# Patient Record
Sex: MALE | Race: WHITE | NOT HISPANIC OR LATINO | Employment: FULL TIME | URBAN - METROPOLITAN AREA
[De-identification: names, ages, dates, MRNs, and addresses within clinical notes are randomized per-mention and may not be internally consistent; named-entity substitution may affect disease eponyms.]

---

## 2023-06-26 ENCOUNTER — HOSPITAL ENCOUNTER (INPATIENT)
Facility: HOSPITAL | Age: 38
LOS: 7 days | Discharge: HOME | DRG: 885 | End: 2023-07-03
Attending: PSYCHIATRY & NEUROLOGY | Admitting: PSYCHIATRY & NEUROLOGY
Payer: MEDICAID

## 2023-06-26 DIAGNOSIS — R45.851 DEPRESSION WITH SUICIDAL IDEATION: Primary | ICD-10-CM

## 2023-06-26 DIAGNOSIS — F32.A DEPRESSION WITH SUICIDAL IDEATION: Primary | ICD-10-CM

## 2023-06-26 PROCEDURE — 80061 LIPID PANEL: CPT | Performed by: PSYCHIATRY & NEUROLOGY

## 2023-06-26 PROCEDURE — 11400000 HC PSYCH PRIVATE ROOM

## 2023-06-26 PROCEDURE — 83036 HEMOGLOBIN GLYCOSYLATED A1C: CPT | Performed by: PSYCHIATRY & NEUROLOGY

## 2023-06-26 RX ORDER — ONDANSETRON 4 MG/1
4 TABLET, ORALLY DISINTEGRATING ORAL EVERY 8 HOURS PRN
Status: DISCONTINUED | OUTPATIENT
Start: 2023-06-26 | End: 2023-07-03 | Stop reason: HOSPADM

## 2023-06-26 RX ORDER — BENZTROPINE MESYLATE 1 MG/ML
2 INJECTION, SOLUTION INTRAMUSCULAR; INTRAVENOUS EVERY 8 HOURS PRN
Status: DISCONTINUED | OUTPATIENT
Start: 2023-06-26 | End: 2023-07-03 | Stop reason: HOSPADM

## 2023-06-26 RX ORDER — PROMETHAZINE HYDROCHLORIDE 25 MG/1
25 TABLET ORAL EVERY 6 HOURS PRN
Status: DISCONTINUED | OUTPATIENT
Start: 2023-06-26 | End: 2023-07-03 | Stop reason: HOSPADM

## 2023-06-26 RX ORDER — OLANZAPINE 10 MG/2ML
10 INJECTION, POWDER, FOR SOLUTION INTRAMUSCULAR EVERY 8 HOURS PRN
Status: DISCONTINUED | OUTPATIENT
Start: 2023-06-26 | End: 2023-07-03 | Stop reason: HOSPADM

## 2023-06-26 RX ORDER — ACETAMINOPHEN 325 MG/1
650 TABLET ORAL EVERY 6 HOURS PRN
Status: DISCONTINUED | OUTPATIENT
Start: 2023-06-26 | End: 2023-07-03 | Stop reason: HOSPADM

## 2023-06-26 RX ORDER — LOPERAMIDE HYDROCHLORIDE 2 MG/1
2 CAPSULE ORAL
Status: DISCONTINUED | OUTPATIENT
Start: 2023-06-26 | End: 2023-07-03 | Stop reason: HOSPADM

## 2023-06-26 RX ORDER — BENZONATATE 100 MG/1
100 CAPSULE ORAL 3 TIMES DAILY PRN
Status: DISCONTINUED | OUTPATIENT
Start: 2023-06-26 | End: 2023-07-03 | Stop reason: HOSPADM

## 2023-06-26 RX ORDER — MAG HYDROX/ALUMINUM HYD/SIMETH 200-200-20
30 SUSPENSION, ORAL (FINAL DOSE FORM) ORAL EVERY 6 HOURS PRN
Status: DISCONTINUED | OUTPATIENT
Start: 2023-06-26 | End: 2023-07-03 | Stop reason: HOSPADM

## 2023-06-26 RX ORDER — IBUPROFEN 200 MG
1 TABLET ORAL DAILY PRN
Status: DISCONTINUED | OUTPATIENT
Start: 2023-06-26 | End: 2023-07-03 | Stop reason: HOSPADM

## 2023-06-26 RX ORDER — OLANZAPINE 10 MG/1
10 TABLET ORAL EVERY 8 HOURS PRN
Status: DISCONTINUED | OUTPATIENT
Start: 2023-06-26 | End: 2023-07-03 | Stop reason: HOSPADM

## 2023-06-26 RX ORDER — HYDROXYZINE PAMOATE 50 MG/1
50 CAPSULE ORAL EVERY 6 HOURS PRN
Status: DISCONTINUED | OUTPATIENT
Start: 2023-06-26 | End: 2023-07-03 | Stop reason: HOSPADM

## 2023-06-27 PROBLEM — N17.9 AKI (ACUTE KIDNEY INJURY): Status: ACTIVE | Noted: 2023-06-27

## 2023-06-27 PROBLEM — F15.90 AMPHETAMINE USE: Status: ACTIVE | Noted: 2023-06-27

## 2023-06-27 PROBLEM — F32.A DEPRESSION WITH SUICIDAL IDEATION: Status: ACTIVE | Noted: 2023-06-27

## 2023-06-27 PROBLEM — F17.200 TOBACCO USE DISORDER: Status: ACTIVE | Noted: 2023-06-27

## 2023-06-27 PROBLEM — I10 ESSENTIAL HYPERTENSION: Status: ACTIVE | Noted: 2023-06-27

## 2023-06-27 PROBLEM — R45.851 DEPRESSION WITH SUICIDAL IDEATION: Status: ACTIVE | Noted: 2023-06-27

## 2023-06-27 PROBLEM — D72.829 LEUKOCYTOSIS: Status: ACTIVE | Noted: 2023-06-27

## 2023-06-27 LAB
ALBUMIN SERPL BCP-MCNC: 3.8 G/DL (ref 3.5–5.2)
ALP SERPL-CCNC: 67 U/L (ref 55–135)
ALT SERPL W/O P-5'-P-CCNC: 17 U/L (ref 10–44)
ANION GAP SERPL CALC-SCNC: 12 MMOL/L (ref 8–16)
AST SERPL-CCNC: 20 U/L (ref 10–40)
BASOPHILS # BLD AUTO: 0.05 K/UL (ref 0–0.2)
BASOPHILS NFR BLD: 0.4 % (ref 0–1.9)
BILIRUB SERPL-MCNC: 0.4 MG/DL (ref 0.1–1)
BUN SERPL-MCNC: 16 MG/DL (ref 6–20)
CALCIUM SERPL-MCNC: 9.4 MG/DL (ref 8.7–10.5)
CHLORIDE SERPL-SCNC: 101 MMOL/L (ref 95–110)
CHOLEST SERPL-MCNC: 181 MG/DL (ref 120–199)
CHOLEST/HDLC SERPL: 3.6 {RATIO} (ref 2–5)
CO2 SERPL-SCNC: 25 MMOL/L (ref 23–29)
CREAT SERPL-MCNC: 1.5 MG/DL (ref 0.5–1.4)
DIFFERENTIAL METHOD: ABNORMAL
EOSINOPHIL # BLD AUTO: 0.4 K/UL (ref 0–0.5)
EOSINOPHIL NFR BLD: 3.4 % (ref 0–8)
ERYTHROCYTE [DISTWIDTH] IN BLOOD BY AUTOMATED COUNT: 12.6 % (ref 11.5–14.5)
EST. GFR  (NO RACE VARIABLE): >60 ML/MIN/1.73 M^2
ESTIMATED AVG GLUCOSE: 108 MG/DL (ref 68–131)
GLUCOSE SERPL-MCNC: 103 MG/DL (ref 70–110)
HBA1C MFR BLD: 5.4 % (ref 4–5.6)
HCT VFR BLD AUTO: 40.2 % (ref 40–54)
HDLC SERPL-MCNC: 50 MG/DL (ref 40–75)
HDLC SERPL: 27.6 % (ref 20–50)
HGB BLD-MCNC: 13.2 G/DL (ref 14–18)
IMM GRANULOCYTES # BLD AUTO: 0.03 K/UL (ref 0–0.04)
IMM GRANULOCYTES NFR BLD AUTO: 0.3 % (ref 0–0.5)
LDLC SERPL CALC-MCNC: 100.4 MG/DL (ref 63–159)
LYMPHOCYTES # BLD AUTO: 1.9 K/UL (ref 1–4.8)
LYMPHOCYTES NFR BLD: 16.4 % (ref 18–48)
MCH RBC QN AUTO: 30 PG (ref 27–31)
MCHC RBC AUTO-ENTMCNC: 32.8 G/DL (ref 32–36)
MCV RBC AUTO: 91 FL (ref 82–98)
MONOCYTES # BLD AUTO: 0.6 K/UL (ref 0.3–1)
MONOCYTES NFR BLD: 5.4 % (ref 4–15)
NEUTROPHILS # BLD AUTO: 8.8 K/UL (ref 1.8–7.7)
NEUTROPHILS NFR BLD: 74.1 % (ref 38–73)
NONHDLC SERPL-MCNC: 131 MG/DL
NRBC BLD-RTO: 0 /100 WBC
PLATELET # BLD AUTO: 273 K/UL (ref 150–450)
PMV BLD AUTO: 8.6 FL (ref 9.2–12.9)
POTASSIUM SERPL-SCNC: 4.3 MMOL/L (ref 3.5–5.1)
PROT SERPL-MCNC: 7.6 G/DL (ref 6–8.4)
RBC # BLD AUTO: 4.4 M/UL (ref 4.6–6.2)
SODIUM SERPL-SCNC: 138 MMOL/L (ref 136–145)
TRIGL SERPL-MCNC: 153 MG/DL (ref 30–150)
WBC # BLD AUTO: 11.84 K/UL (ref 3.9–12.7)

## 2023-06-27 PROCEDURE — 99232 SBSQ HOSP IP/OBS MODERATE 35: CPT | Mod: 95,,, | Performed by: PHYSICIAN ASSISTANT

## 2023-06-27 PROCEDURE — 36415 COLL VENOUS BLD VENIPUNCTURE: CPT | Performed by: PSYCHIATRY & NEUROLOGY

## 2023-06-27 PROCEDURE — 85025 COMPLETE CBC W/AUTO DIFF WBC: CPT | Performed by: PHYSICIAN ASSISTANT

## 2023-06-27 PROCEDURE — 25000003 PHARM REV CODE 250: Performed by: STUDENT IN AN ORGANIZED HEALTH CARE EDUCATION/TRAINING PROGRAM

## 2023-06-27 PROCEDURE — 99223 PR INITIAL HOSPITAL CARE,LEVL III: ICD-10-PCS | Mod: ,,, | Performed by: STUDENT IN AN ORGANIZED HEALTH CARE EDUCATION/TRAINING PROGRAM

## 2023-06-27 PROCEDURE — 99223 1ST HOSP IP/OBS HIGH 75: CPT | Mod: ,,, | Performed by: STUDENT IN AN ORGANIZED HEALTH CARE EDUCATION/TRAINING PROGRAM

## 2023-06-27 PROCEDURE — 99232 PR SUBSEQUENT HOSPITAL CARE,LEVL II: ICD-10-PCS | Mod: 95,,, | Performed by: PHYSICIAN ASSISTANT

## 2023-06-27 PROCEDURE — 90833 PR PSYCHOTHERAPY W/PATIENT W/E&M, 30 MIN (ADD ON): ICD-10-PCS | Mod: ,,, | Performed by: STUDENT IN AN ORGANIZED HEALTH CARE EDUCATION/TRAINING PROGRAM

## 2023-06-27 PROCEDURE — 80053 COMPREHEN METABOLIC PANEL: CPT | Performed by: PHYSICIAN ASSISTANT

## 2023-06-27 PROCEDURE — 11400000 HC PSYCH PRIVATE ROOM

## 2023-06-27 PROCEDURE — 36415 COLL VENOUS BLD VENIPUNCTURE: CPT | Performed by: PHYSICIAN ASSISTANT

## 2023-06-27 PROCEDURE — 90833 PSYTX W PT W E/M 30 MIN: CPT | Mod: ,,, | Performed by: STUDENT IN AN ORGANIZED HEALTH CARE EDUCATION/TRAINING PROGRAM

## 2023-06-27 PROCEDURE — 25000003 PHARM REV CODE 250: Performed by: PHYSICIAN ASSISTANT

## 2023-06-27 RX ORDER — CLONAZEPAM 1 MG/1
1 TABLET ORAL 3 TIMES DAILY
Status: DISCONTINUED | OUTPATIENT
Start: 2023-06-27 | End: 2023-07-03 | Stop reason: HOSPADM

## 2023-06-27 RX ORDER — AMLODIPINE BESYLATE 5 MG/1
5 TABLET ORAL DAILY
Status: DISCONTINUED | OUTPATIENT
Start: 2023-06-27 | End: 2023-07-01

## 2023-06-27 RX ORDER — ARIPIPRAZOLE 10 MG/1
10 TABLET ORAL DAILY
Status: DISCONTINUED | OUTPATIENT
Start: 2023-06-28 | End: 2023-06-30

## 2023-06-27 RX ADMIN — CLONAZEPAM 1 MG: 1 TABLET ORAL at 03:06

## 2023-06-27 RX ADMIN — CLONAZEPAM 1 MG: 1 TABLET ORAL at 08:06

## 2023-06-27 RX ADMIN — AMLODIPINE BESYLATE 5 MG: 5 TABLET ORAL at 03:06

## 2023-06-27 NOTE — NURSING
Pt calm with a flat affect with a delayed response. Pt recently discharged form [place]. Pt admits to having suicidal thoughts with no plan. Denies HI. Pt says he is hearing voices but is unable to determine what they are saying. Patient contracted safety with staff and unit. Educated, reviewed  and discussed plan of care and medication regiment with this patient. Patient voiced understanding of all teachings.

## 2023-06-27 NOTE — PLAN OF CARE
Patient to [facility] from [facility], via wheelchair, wearing hospital provided paper scrubs and socks. escorted by hospital security and 1 MH. WANDED, no contraband found, ambulated to assessment room without difficulty. Skin assessment completed.   Patient admitted to U per PEC:

## 2023-06-27 NOTE — PLAN OF CARE
Pt is somewhat subdued, still slightly sedated.  Resting in his room a lot.  Pt encouraged to drink po fluids and increase activity as tolerated, understanding verbalized.  Pt denies any S/I or H/I at this time.  Med compliant.  Will allow pt rest and continue to monitor.
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
## 2023-06-27 NOTE — ASSESSMENT & PLAN NOTE
Mildly elevated  Likely due to meth use  Afebrile   Repeat cbc pending  U/A positive nitrites but denies leukocytes and elevated WBC (dont suspect UTI)

## 2023-06-27 NOTE — HPI
Patient is a 38 year old male with medical history of HTN, tobacco use disorder, bipolar 1 disorder and methamphetamine who was admitted to [facility] for psychosis.  Hospital medicine consulted for medical management.

## 2023-06-27 NOTE — PLAN OF CARE
Lying quietly in bed, eyes closed, respirations even, unlabored. Apparently asleep. Slept 5 hours thus far without interruption. Safety precautions maintained. Rounds done every 15 minutes. Bed is fixed in low position and room is uncluttered and pathways are clear.

## 2023-06-27 NOTE — CONSULTS
[facility] Medicine  Consult Note    Patient Name: Richie Zaman  MRN: [id number]  Admission Date: 6/26/2023  Hospital Length of Stay: 1 days  Attending Physician: Mohit Tyson III, MD   Primary Care Provider: Babar Low MD           Patient information was obtained from patient and ER records.     Inpatient consult to [facility] for History and Physical  Consult performed by: Adela Lui PA-C  Consult ordered by: David Bowen MD        Subjective:     Principal Problem: Depression with suicidal ideation    Chief Complaint: No chief complaint on file.       HPI: Patient is a 38 year old male with medical history of HTN, tobacco use disorder, bipolar 1 disorder and methamphetamine who was admitted to [facility] for psychosis.  Hospital medicine consulted for medical management.        Past Medical History:   Diagnosis Date    Bipolar 1 disorder     Methamphetamine abuse        No past surgical history on file.    Review of patient's allergies indicates:  No Known Allergies    Current Facility-Administered Medications on File Prior to Encounter   Medication    [COMPLETED] cefTRIAXone (ROCEPHIN) 1 g in dextrose 5 % in water (D5W) 5 % 100 mL IVPB (MB+)    [COMPLETED] LORazepam tablet 2 mg    [COMPLETED] sodium chloride 0.9% bolus 1,000 mL 1,000 mL    [DISCONTINUED] diphenhydrAMINE injection 50 mg    [DISCONTINUED] haloperidol lactate injection 5 mg    [DISCONTINUED] LORazepam injection 2 mg     Current Outpatient Medications on File Prior to Encounter   Medication Sig    busPIRone (BUSPAR) 15 MG tablet Take 15 mg by mouth 3 (three) times daily.    cefdinir (OMNICEF) 300 MG capsule Take 1 capsule (300 mg total) by mouth 2 (two) times daily. for 10 days    clonazePAM (KLONOPIN) 0.5 MG tablet Take 1 tablet (0.5 mg total) by mouth 2 (two) times daily as needed for Anxiety.    gabapentin (NEURONTIN) 100 MG capsule Take 1 capsule (100 mg total) by mouth 2 (two) times  daily as needed (nerve pain).    hydrOXYzine pamoate (VISTARIL) 25 MG Cap Take 1 capsule (25 mg total) by mouth every 6 (six) hours as needed (Anxiety).    risperiDONE (RISPERDAL) 1 MG tablet Take 1 mg by mouth 2 (two) times daily.     Family History    None       Tobacco Use    Smoking status: Never    Smokeless tobacco: Current     Types: Chew   Substance and Sexual Activity    Alcohol use: No    Drug use: No    Sexual activity: Not on file     Review of Systems   Constitutional:  Negative for chills and fever.   Respiratory:  Negative for cough, shortness of breath and wheezing.    Cardiovascular:  Negative for chest pain and palpitations.   Gastrointestinal:  Negative for abdominal distention, constipation, diarrhea, nausea and vomiting.   Genitourinary:  Negative for difficulty urinating and dysuria.   Objective:     Vital Signs (Most Recent):  Temp: 97.9 °F (36.6 °C) (06/27/23 0805)  Pulse: 97 (06/27/23 0805)  Resp: 20 (06/27/23 0805)  BP: (!) 149/100 (06/27/23 0805)  SpO2: 99 % (06/27/23 0023) Vital Signs (24h Range):  Temp:  [97 °F (36.1 °C)-97.9 °F (36.6 °C)] 97.9 °F (36.6 °C)  Pulse:  [] 97  Resp:  [16-20] 20  SpO2:  [99 %-100 %] 99 %  BP: (145-197)/() 149/100     Weight: 122.3 kg (269 lb 8.2 oz)  Body mass index is 34.6 kg/m².     Physical Exam  Constitutional:       Appearance: Normal appearance.   HENT:      Head: Normocephalic and atraumatic.   Cardiovascular:      Rate and Rhythm: Normal rate and regular rhythm.   Pulmonary:      Effort: Pulmonary effort is normal.      Breath sounds: Normal breath sounds.   Abdominal:      General: There is no distension.      Tenderness: There is no abdominal tenderness.   Musculoskeletal:         General: No swelling or tenderness.      Cervical back: Neck supple.      Right lower leg: No edema.      Left lower leg: No edema.   Skin:     General: Skin is warm and dry.   Neurological:      Mental Status: He is alert.      Cranial Nerves: Cranial  nerves 2-12 are intact.      Comments: CN II: Visual Fields full to confrontation  CN III, IV , VI PERRL   CN III: no palsy   Nystagmus: none  Diplopia: none  Ophthalmoparesis: none  CN V Facial sensation intact  CN VII facial expression full, symmetric  CN VIII normal  CN IX normal  CN X normal  CN XI normal  CN XII normal         Significant Labs: UPT  No results found for this or any previous visit.  U/A  Negative for UTI   UDS  Results for orders placed or performed during the hospital encounter of 06/26/23   Drug screen panel, emergency   Result Value Ref Range    Benzodiazepines Negative Negative    Methadone metabolites Negative Negative    Cocaine (Metab.) Negative Negative    Opiate Scrn, Ur Negative Negative    Barbiturate Screen, Ur Negative Negative    Amphetamine Screen, Ur Presumptive Positive (A) Negative    THC Negative Negative    Phencyclidine Negative Negative    Creatinine, Urine 155.2 23.0 - 375.0 mg/dL    Toxicology Information SEE COMMENT      CBC  Results for orders placed or performed during the hospital encounter of 06/26/23   CBC auto differential   Result Value Ref Range    WBC 13.06 (H) 3.90 - 12.70 K/uL    RBC 4.49 (L) 4.60 - 6.20 M/uL    Hemoglobin 13.6 (L) 14.0 - 18.0 g/dL    Hematocrit 41.8 40.0 - 54.0 %    MCV 93 82 - 98 fL    MCH 30.3 27.0 - 31.0 pg    MCHC 32.5 32.0 - 36.0 g/dL    RDW 12.7 11.5 - 14.5 %    Platelets 314 150 - 450 K/uL    MPV 8.7 (L) 9.2 - 12.9 fL    Immature Granulocytes 0.4 0.0 - 0.5 %    Gran # (ANC) 8.8 (H) 1.8 - 7.7 K/uL    Immature Grans (Abs) 0.05 (H) 0.00 - 0.04 K/uL    Lymph # 2.8 1.0 - 4.8 K/uL    Mono # 0.9 0.3 - 1.0 K/uL    Eos # 0.4 0.0 - 0.5 K/uL    Baso # 0.04 0.00 - 0.20 K/uL    nRBC 0 0 /100 WBC    Gran % 67.6 38.0 - 73.0 %    Lymph % 21.7 18.0 - 48.0 %    Mono % 6.7 4.0 - 15.0 %    Eosinophil % 3.3 0.0 - 8.0 %    Basophil % 0.3 0.0 - 1.9 %    Differential Method Automated      CMP  Results for orders placed or performed during the hospital  encounter of 06/26/23   Comprehensive metabolic panel   Result Value Ref Range    Sodium 141 136 - 145 mmol/L    Potassium 3.5 3.5 - 5.1 mmol/L    Chloride 103 95 - 110 mmol/L    CO2 23 23 - 29 mmol/L    Glucose 122 (H) 70 - 110 mg/dL    BUN 20 6 - 20 mg/dL    Creatinine 1.6 (H) 0.5 - 1.4 mg/dL    Calcium 9.7 8.7 - 10.5 mg/dL    Total Protein 8.5 (H) 6.0 - 8.4 g/dL    Albumin 4.3 3.5 - 5.2 g/dL    Total Bilirubin 0.4 0.1 - 1.0 mg/dL    Alkaline Phosphatase 79 55 - 135 U/L    AST 16 10 - 40 U/L    ALT 18 10 - 44 U/L    eGFR 57 (A) >60 mL/min/1.73 m^2    Anion Gap 15 8 - 16 mmol/L     TSH  Results for orders placed or performed during the hospital encounter of 06/26/23   TSH   Result Value Ref Range    TSH 0.924 0.400 - 4.000 uIU/mL     ETOH  Results for orders placed or performed during the hospital encounter of 06/26/23   Ethanol   Result Value Ref Range    Alcohol, Serum <10 <10 mg/dL     Salicylate  No results found for this or any previous visit.  Acetaminophen  Results for orders placed or performed during the hospital encounter of 06/26/23   Acetaminophen level   Result Value Ref Range    Acetaminophen (Tylenol), Serum <3.0 (L) 10.0 - 20.0 ug/mL             Significant Imaging: I have reviewed all pertinent imaging results/findings within the past 24 hours.    Assessment/Plan:     * Depression with suicidal ideation  Defer to psych        Tobacco use disorder  Nicotine patch prn     Essential hypertension  Started norvasc  Unsure home bp medication  Will monitor     Leukocytosis  Mildly elevated  Likely due to meth use  Afebrile   Repeat cbc pending  U/A positive nitrites but denies leukocytes and elevated WBC (dont suspect UTI)      Amphetamine use  Seen on UDS   Suspect leukocytosis and SANTI due to use  Cessation resources at discharge       SANTI (acute kidney injury)  Due methamphetamine use  Repeat bmp pending     VTE Risk Mitigation (From admission, onward)    None              Thank you for your consult. I  will follow-up with patient. Please contact us if you have any additional questions.    Adela Lui PA-C  Department of [facility] Medicine   [facility]

## 2023-06-27 NOTE — PLAN OF CARE
Patient to [facility] from [facility], via wheelchair, wearing hospital provided paper scrubs and socks. escorted by hospital security and 1 MH. WANDED, no contraband found, ambulated to assessment room without difficulty. Skin assessment completed.   Patient admitted to [facility] per [facility]:

## 2023-06-27 NOTE — SUBJECTIVE & OBJECTIVE
Past Medical History:   Diagnosis Date    Bipolar 1 disorder     Methamphetamine abuse        No past surgical history on file.    Review of patient's allergies indicates:  No Known Allergies    Current Facility-Administered Medications on File Prior to Encounter   Medication    [COMPLETED] cefTRIAXone (ROCEPHIN) 1 g in dextrose 5 % in water (D5W) 5 % 100 mL IVPB (MB+)    [COMPLETED] LORazepam tablet 2 mg    [COMPLETED] sodium chloride 0.9% bolus 1,000 mL 1,000 mL    [DISCONTINUED] diphenhydrAMINE injection 50 mg    [DISCONTINUED] haloperidol lactate injection 5 mg    [DISCONTINUED] LORazepam injection 2 mg     Current Outpatient Medications on File Prior to Encounter   Medication Sig    busPIRone (BUSPAR) 15 MG tablet Take 15 mg by mouth 3 (three) times daily.    cefdinir (OMNICEF) 300 MG capsule Take 1 capsule (300 mg total) by mouth 2 (two) times daily. for 10 days    clonazePAM (KLONOPIN) 0.5 MG tablet Take 1 tablet (0.5 mg total) by mouth 2 (two) times daily as needed for Anxiety.    gabapentin (NEURONTIN) 100 MG capsule Take 1 capsule (100 mg total) by mouth 2 (two) times daily as needed (nerve pain).    hydrOXYzine pamoate (VISTARIL) 25 MG Cap Take 1 capsule (25 mg total) by mouth every 6 (six) hours as needed (Anxiety).    risperiDONE (RISPERDAL) 1 MG tablet Take 1 mg by mouth 2 (two) times daily.     Family History    None       Tobacco Use    Smoking status: Never    Smokeless tobacco: Current     Types: Chew   Substance and Sexual Activity    Alcohol use: No    Drug use: No    Sexual activity: Not on file     Review of Systems   Constitutional:  Negative for chills and fever.   Respiratory:  Negative for cough, shortness of breath and wheezing.    Cardiovascular:  Negative for chest pain and palpitations.   Gastrointestinal:  Negative for abdominal distention, constipation, diarrhea, nausea and vomiting.   Genitourinary:  Negative for difficulty urinating and dysuria.   Objective:     Vital Signs (Most  Recent):  Temp: 97.9 °F (36.6 °C) (06/27/23 0805)  Pulse: 97 (06/27/23 0805)  Resp: 20 (06/27/23 0805)  BP: (!) 149/100 (06/27/23 0805)  SpO2: 99 % (06/27/23 0023) Vital Signs (24h Range):  Temp:  [97 °F (36.1 °C)-97.9 °F (36.6 °C)] 97.9 °F (36.6 °C)  Pulse:  [] 97  Resp:  [16-20] 20  SpO2:  [99 %-100 %] 99 %  BP: (145-197)/() 149/100     Weight: 122.3 kg (269 lb 8.2 oz)  Body mass index is 34.6 kg/m².     Physical Exam  Constitutional:       Appearance: Normal appearance.   HENT:      Head: Normocephalic and atraumatic.   Cardiovascular:      Rate and Rhythm: Normal rate and regular rhythm.   Pulmonary:      Effort: Pulmonary effort is normal.      Breath sounds: Normal breath sounds.   Abdominal:      General: There is no distension.      Tenderness: There is no abdominal tenderness.   Musculoskeletal:         General: No swelling or tenderness.      Cervical back: Neck supple.      Right lower leg: No edema.      Left lower leg: No edema.   Skin:     General: Skin is warm and dry.   Neurological:      Mental Status: He is alert.      Cranial Nerves: Cranial nerves 2-12 are intact.      Comments: CN II: Visual Fields full to confrontation  CN III, IV , VI PERRL   CN III: no palsy   Nystagmus: none  Diplopia: none  Ophthalmoparesis: none  CN V Facial sensation intact  CN VII facial expression full, symmetric  CN VIII normal  CN IX normal  CN X normal  CN XI normal  CN XII normal         Significant Labs: UPT  No results found for this or any previous visit.  U/A  Negative for UTI   UDS  Results for orders placed or performed during the hospital encounter of 06/26/23   Drug screen panel, emergency   Result Value Ref Range    Benzodiazepines Negative Negative    Methadone metabolites Negative Negative    Cocaine (Metab.) Negative Negative    Opiate Scrn, Ur Negative Negative    Barbiturate Screen, Ur Negative Negative    Amphetamine Screen, Ur Presumptive Positive (A) Negative    THC Negative Negative     Phencyclidine Negative Negative    Creatinine, Urine 155.2 23.0 - 375.0 mg/dL    Toxicology Information SEE COMMENT      CBC  Results for orders placed or performed during the hospital encounter of 06/26/23   CBC auto differential   Result Value Ref Range    WBC 13.06 (H) 3.90 - 12.70 K/uL    RBC 4.49 (L) 4.60 - 6.20 M/uL    Hemoglobin 13.6 (L) 14.0 - 18.0 g/dL    Hematocrit 41.8 40.0 - 54.0 %    MCV 93 82 - 98 fL    MCH 30.3 27.0 - 31.0 pg    MCHC 32.5 32.0 - 36.0 g/dL    RDW 12.7 11.5 - 14.5 %    Platelets 314 150 - 450 K/uL    MPV 8.7 (L) 9.2 - 12.9 fL    Immature Granulocytes 0.4 0.0 - 0.5 %    Gran # (ANC) 8.8 (H) 1.8 - 7.7 K/uL    Immature Grans (Abs) 0.05 (H) 0.00 - 0.04 K/uL    Lymph # 2.8 1.0 - 4.8 K/uL    Mono # 0.9 0.3 - 1.0 K/uL    Eos # 0.4 0.0 - 0.5 K/uL    Baso # 0.04 0.00 - 0.20 K/uL    nRBC 0 0 /100 WBC    Gran % 67.6 38.0 - 73.0 %    Lymph % 21.7 18.0 - 48.0 %    Mono % 6.7 4.0 - 15.0 %    Eosinophil % 3.3 0.0 - 8.0 %    Basophil % 0.3 0.0 - 1.9 %    Differential Method Automated      CMP  Results for orders placed or performed during the hospital encounter of 06/26/23   Comprehensive metabolic panel   Result Value Ref Range    Sodium 141 136 - 145 mmol/L    Potassium 3.5 3.5 - 5.1 mmol/L    Chloride 103 95 - 110 mmol/L    CO2 23 23 - 29 mmol/L    Glucose 122 (H) 70 - 110 mg/dL    BUN 20 6 - 20 mg/dL    Creatinine 1.6 (H) 0.5 - 1.4 mg/dL    Calcium 9.7 8.7 - 10.5 mg/dL    Total Protein 8.5 (H) 6.0 - 8.4 g/dL    Albumin 4.3 3.5 - 5.2 g/dL    Total Bilirubin 0.4 0.1 - 1.0 mg/dL    Alkaline Phosphatase 79 55 - 135 U/L    AST 16 10 - 40 U/L    ALT 18 10 - 44 U/L    eGFR 57 (A) >60 mL/min/1.73 m^2    Anion Gap 15 8 - 16 mmol/L     TSH  Results for orders placed or performed during the hospital encounter of 06/26/23   TSH   Result Value Ref Range    TSH 0.924 0.400 - 4.000 uIU/mL     ETOH  Results for orders placed or performed during the hospital encounter of 06/26/23   Ethanol   Result Value Ref Range     Alcohol, Serum <10 <10 mg/dL     Salicylate  No results found for this or any previous visit.  Acetaminophen  Results for orders placed or performed during the hospital encounter of 06/26/23   Acetaminophen level   Result Value Ref Range    Acetaminophen (Tylenol), Serum <3.0 (L) 10.0 - 20.0 ug/mL             Significant Imaging: I have reviewed all pertinent imaging results/findings within the past 24 hours.

## 2023-06-27 NOTE — PLAN OF CARE
Problem: Adult Behavioral Health Plan of Care  Goal: Optimized Coping Skills in Response to Life Stressors  Outcome: Ongoing, Progressing     Pt did not attend group today. PLPC met with pt individually.  PLPC attempted to  discuss with pt on group discussion. Pt was resting in bed quietly. Pt  states he was not feeling well. PLPC discussed with pt PLPC will come back at a later time and date to discuss group.

## 2023-06-28 PROCEDURE — 11400000 HC PSYCH PRIVATE ROOM

## 2023-06-28 PROCEDURE — 90833 PSYTX W PT W E/M 30 MIN: CPT | Mod: ,,, | Performed by: STUDENT IN AN ORGANIZED HEALTH CARE EDUCATION/TRAINING PROGRAM

## 2023-06-28 PROCEDURE — 25000003 PHARM REV CODE 250: Performed by: STUDENT IN AN ORGANIZED HEALTH CARE EDUCATION/TRAINING PROGRAM

## 2023-06-28 PROCEDURE — 99233 SBSQ HOSP IP/OBS HIGH 50: CPT | Mod: ,,, | Performed by: STUDENT IN AN ORGANIZED HEALTH CARE EDUCATION/TRAINING PROGRAM

## 2023-06-28 PROCEDURE — 25000003 PHARM REV CODE 250: Performed by: PHYSICIAN ASSISTANT

## 2023-06-28 PROCEDURE — 99233 PR SUBSEQUENT HOSPITAL CARE,LEVL III: ICD-10-PCS | Mod: ,,, | Performed by: STUDENT IN AN ORGANIZED HEALTH CARE EDUCATION/TRAINING PROGRAM

## 2023-06-28 PROCEDURE — 90833 PR PSYCHOTHERAPY W/PATIENT W/E&M, 30 MIN (ADD ON): ICD-10-PCS | Mod: ,,, | Performed by: STUDENT IN AN ORGANIZED HEALTH CARE EDUCATION/TRAINING PROGRAM

## 2023-06-28 RX ADMIN — CLONAZEPAM 1 MG: 1 TABLET ORAL at 08:06

## 2023-06-28 RX ADMIN — CLONAZEPAM 1 MG: 1 TABLET ORAL at 02:06

## 2023-06-28 RX ADMIN — AMLODIPINE BESYLATE 5 MG: 5 TABLET ORAL at 08:06

## 2023-06-28 RX ADMIN — ARIPIPRAZOLE 10 MG: 10 TABLET ORAL at 08:06

## 2023-06-28 NOTE — PLAN OF CARE
Lying quietly in bed, eyes closed, respirations even, unlabored. Apparently asleep. Slept 8 hours thus far with one interruption. Safety precautions maintained. Rounds done every 15 minutes. Bed is fixed in low position and room is uncluttered and pathways are clear.

## 2023-06-28 NOTE — PROGRESS NOTES
PSYCHIATRY DAILY INPATIENT PROGRESS NOTE  SUBSEQUENT HOSPITAL VISIT    ENCOUNTER DATE: 6/28/2023  SITE: [facility]    DATE OF ADMISSION: 6/26/2023 11:54 PM  LENGTH OF STAY: 2 days      CHIEF COMPLAINT   Richie Zaman is a 38 y.o. male, seen during daily morales rounds on the inpatient unit.  Richie Zaman presented with the chief complaint of depression and psychosis      The patient was seen and examined. The chart was reviewed.     Reviewed notes from Rns, PA, and LPC and labs from the last 24 hours.    The patient's case was discussed with the treatment team/care providers today including Rns    Staff reports no behavioral or management issues.     The patient has been compliant with treatment.      Subjective 06/28/2023       Today the patient reports he misses his family. Discussed substance abuse at length in connection with psychosis. Patient did accept information and states he will try to abstain from amphetamine abuse however reports he is having cravings.     The patient denies any side effects to medications.        Interim/overnight events per report/notes:  Per RN:  Pt is somewhat subdued, still slightly sedated.  Resting in his room a lot.  Pt encouraged to drink po fluids and increase activity as tolerated, understanding verbalized.        Psychiatric ROS (observed, reported, or endorsed/denied):  Depressed mood - fluctuating  Interest/pleasure/anhedonia: Continuing  Guilt/hopelessness/worthlessness - less  Changes in Sleep - fluctuating  Changes in Appetite - fluctuating  Changes in Concentration - fluctuating  Changes in Energy - fluctuating  PMA/R- fluctuating  Suicidal- active/passive ideations - less  Homicidal ideations: active/passive ideations - No    Hallucinations - Continuing  Delusions - Continuing  Disorganized behavior - No  Disorganized speech - No  Negative symptoms - Continuing    Elevated mood - No  Decreased need for sleep - No  Grandiosity - No  Racing thoughts - No  Impulsivity  - No  Irritability- No  Increased energy - No  Distractibility - No  Increase in goal-directed activity or PMA- No    Symptoms of OK - fluctuating  Symptoms of Panic Disorder- fluctuating  Symptoms of PTSD - No        Overall progress: Patient is showing mild improvement         Psychotherapy:  Target symptoms: substance abuse  Why chosen therapy is appropriate versus another modality: relevant to diagnosis  Outcome monitoring methods: self-report  Therapeutic intervention type: supportive psychotherapy  Topics discussed/themes: building skills sets for symptom management, symptom recognition  The patient's response to the intervention is accepting. The patient's progress toward treatment goals is fair.   Duration of intervention: 16 minutes.        Medical ROS  Review of Systems   Constitutional:  Negative for chills and fever.   HENT:  Negative for hearing loss and tinnitus.    Eyes:  Negative for blurred vision and double vision.   Respiratory:  Negative for shortness of breath.    Cardiovascular:  Negative for chest pain and palpitations.   Gastrointestinal:  Negative for constipation, diarrhea, nausea and vomiting.   Genitourinary:  Negative for dysuria.   Musculoskeletal:  Negative for back pain and neck pain.   Skin:  Negative for rash.   Neurological:  Negative for dizziness and headaches.   Endo/Heme/Allergies:  Negative for environmental allergies.       PAST MEDICAL HISTORY   Past Medical History:   Diagnosis Date    Bipolar 1 disorder     Methamphetamine abuse            PSYCHOTROPIC MEDICATIONS   Scheduled Meds:   amLODIPine  5 mg Oral Daily    ARIPiprazole  10 mg Oral Daily    clonazePAM  1 mg Oral TID     Continuous Infusions:  PRN Meds:.acetaminophen, aluminum-magnesium hydroxide-simethicone, benzonatate, benztropine mesylate, hydrOXYzine pamoate, loperamide, nicotine, OLANZapine **AND** OLANZapine, ondansetron, promethazine        EXAMINATION    VITALS   Vitals:    06/27/23 0805 06/27/23 1938  06/28/23 0751 06/28/23 0810   BP: (!) 149/100 (!) 147/97 123/86    BP Location: Left arm Right arm Right arm    Patient Position: Lying Sitting Sitting    Pulse: 97 88 108    Resp: 20 18 18    Temp: 97.9 °F (36.6 °C) 97.6 °F (36.4 °C) 97.9 °F (36.6 °C)    TempSrc: Temporal Temporal Temporal    SpO2:       Weight:    120.7 kg (265 lb 15.8 oz)   Height:           Body mass index is 34.15 kg/m².        CONSTITUTIONAL  General Appearance: unremarkable, age appropriate    MUSCULOSKELETAL  Muscle Strength and Tone:no tremor, no tic  Abnormal Involuntary Movements: No  Gait and Station: non-ataxic    PSYCHIATRIC   Level of Consciousness: awake and alert   Orientation: person, place, and situation  Grooming: Casually dressed and Well groomed  Psychomotor Behavior: normal, cooperative  Speech: normal tone, normal rate, normal pitch, normal volume  Language: grossly intact  Mood: anxious  Affect: Consistent with mood  Thought Process: linear, logical  Associations: intact   Thought Content: +delusions, less SI, and denies HI  Perceptions: +AH and denies  VH  Memory: Able to recall past events, Remote intact, and Recent intact  Attention:Attends to interview without distraction  Fund of Knowledge: Aware of current events and Vocabulary appropriate   Estimate if Intelligence:  Average based on work/education history, vocabulary and mental status exam  Insight: has awareness of illness  Judgment: behavior is adequate to circumstances        DIAGNOSTIC TESTING   Laboratory Results  Recent Results (from the past 24 hour(s))   CBC auto differential    Collection Time: 06/27/23  5:11 PM   Result Value Ref Range    WBC 11.84 3.90 - 12.70 K/uL    RBC 4.40 (L) 4.60 - 6.20 M/uL    Hemoglobin 13.2 (L) 14.0 - 18.0 g/dL    Hematocrit 40.2 40.0 - 54.0 %    MCV 91 82 - 98 fL    MCH 30.0 27.0 - 31.0 pg    MCHC 32.8 32.0 - 36.0 g/dL    RDW 12.6 11.5 - 14.5 %    Platelets 273 150 - 450 K/uL    MPV 8.6 (L) 9.2 - 12.9 fL    Immature Granulocytes  0.3 0.0 - 0.5 %    Gran # (ANC) 8.8 (H) 1.8 - 7.7 K/uL    Immature Grans (Abs) 0.03 0.00 - 0.04 K/uL    Lymph # 1.9 1.0 - 4.8 K/uL    Mono # 0.6 0.3 - 1.0 K/uL    Eos # 0.4 0.0 - 0.5 K/uL    Baso # 0.05 0.00 - 0.20 K/uL    nRBC 0 0 /100 WBC    Gran % 74.1 (H) 38.0 - 73.0 %    Lymph % 16.4 (L) 18.0 - 48.0 %    Mono % 5.4 4.0 - 15.0 %    Eosinophil % 3.4 0.0 - 8.0 %    Basophil % 0.4 0.0 - 1.9 %    Differential Method Automated    Comprehensive metabolic panel    Collection Time: 06/27/23  5:11 PM   Result Value Ref Range    Sodium 138 136 - 145 mmol/L    Potassium 4.3 3.5 - 5.1 mmol/L    Chloride 101 95 - 110 mmol/L    CO2 25 23 - 29 mmol/L    Glucose 103 70 - 110 mg/dL    BUN 16 6 - 20 mg/dL    Creatinine 1.5 (H) 0.5 - 1.4 mg/dL    Calcium 9.4 8.7 - 10.5 mg/dL    Total Protein 7.6 6.0 - 8.4 g/dL    Albumin 3.8 3.5 - 5.2 g/dL    Total Bilirubin 0.4 0.1 - 1.0 mg/dL    Alkaline Phosphatase 67 55 - 135 U/L    AST 20 10 - 40 U/L    ALT 17 10 - 44 U/L    eGFR >60 >60 mL/min/1.73 m^2    Anion Gap 12 8 - 16 mmol/L              MEDICAL DECISION MAKING          ASSESSMENT         Bipolar disorder, type II, MRE depressed, severe with psychotic features  OK  Panic disorder  Amphetamine abuse  Nicotine dependence  Psychosocial stressors     SANTI  HTN          PROBLEM LIST AND MANAGEMENT PLANS           Bipolar disorder, type II, MRE depressed, severe with psychotic features  - stop risperdal, wellbutrin  - started/continue abilify 10 mg PO qd today  - pt counseled  - follow up with outpatient mental health providers after discharge for medication management and psychotherapy     OK  - increase home dose of klonopin to 1 mg PO TID  - pt counseled  - follow up with outpatient mental health providers after discharge for medication management and psychotherapy     Panic disorder  - increase home dose of klonopin to 1 mg PO TID  - pt counseled  - follow up with outpatient mental health providers after discharge for medication  management and psychotherapy     Amphetamine abuse  - SW consulted for assistance with additional resources   - pt counseled     Nicotine dependence  - start nicotine patch 14 mg PO qd PRN     Psychosocial stressors  - pt counseled  - SW consulted for assistance with additional resources         SANTI  - FM consulted  - recheck CMP tomorrow    HTN  - FM consulted, started norvasc                 Discussed diagnosis, risks and benefits of proposed treatment vs alternative treatments vs no treatment, potential side effects of these treatments and the inherent unpredictability of treatment. The patient expresses understanding of the above and displays the capacity to agree with this treatment given said understanding. Patient also agrees that, currently, the benefits outweigh the risks and would like to pursue/continue treatment at this time.    Any medications being used off-label were discussed with the patient inclusive of the evidence base for the use of the medications and consent was obtained for the off-label use of the medication.       DISCHARGE PLANNING  Expected Disposition Plan: Home or Self Care      NEED FOR CONTINUED HOSPITALIZATION  Psychiatric illness continues to pose a potential threat to life or bodily function, of self or others, thereby requiring the need for continued inpatient psychiatric hospitalization: Yes, due to: danger to self, danger to others, and gravely disabled, as evidenced by:  Ongoing concerns with perceptual aberrancy and paranoid persecutory delusions leading to potential harm of self or others.    Protective inpatient pyschiatric hospitalization required while a safe disposition plan is enacted: Yes    Patient stabilized and ready for discharge from inpatient psychiatric unit: No        STAFF:   Mohit yTson III, MD  Psychiatry

## 2023-06-28 NOTE — PLAN OF CARE
Pt calm and cooperative, denies SI at this time, out on unit more interacting with staff and peers, appetite good, med compliant, safety precautions maintained, will continue to monitor

## 2023-06-28 NOTE — PLAN OF CARE
Problem: Adult Behavioral Health Plan of Care  Goal: Optimized Coping Skills in Response to Life Stressors  Outcome: Ongoing, Progressing    GROUP NOTE:    Pt is isolating in room, in bed refusing to attend group. Pt was in bed resting. Patient states he felt tired. Staff encouraged pt to meet with staff at a later time to initatie group discussion.

## 2023-06-28 NOTE — PLAN OF CARE
"Pt reports feeling "a little anxious" today, spending majority of time in room with minimal interaction with staff and peers. Denies feeling depressed. Denies SI/HI. Denies hallucinations at this time. Appetite adequate. Denies sleep disturbances. Medication compliant. NADN. Remains calm and cooperative. Safety precautions remain in place.  "

## 2023-06-29 LAB
ALBUMIN SERPL BCP-MCNC: 3.7 G/DL (ref 3.5–5.2)
ALP SERPL-CCNC: 59 U/L (ref 55–135)
ALT SERPL W/O P-5'-P-CCNC: 12 U/L (ref 10–44)
ANION GAP SERPL CALC-SCNC: 11 MMOL/L (ref 8–16)
AST SERPL-CCNC: 16 U/L (ref 10–40)
BILIRUB SERPL-MCNC: 0.4 MG/DL (ref 0.1–1)
BUN SERPL-MCNC: 20 MG/DL (ref 6–20)
CALCIUM SERPL-MCNC: 9 MG/DL (ref 8.7–10.5)
CHLORIDE SERPL-SCNC: 104 MMOL/L (ref 95–110)
CO2 SERPL-SCNC: 23 MMOL/L (ref 23–29)
CREAT SERPL-MCNC: 1.1 MG/DL (ref 0.5–1.4)
EST. GFR  (NO RACE VARIABLE): >60 ML/MIN/1.73 M^2
GLUCOSE SERPL-MCNC: 90 MG/DL (ref 70–110)
POTASSIUM SERPL-SCNC: 3.8 MMOL/L (ref 3.5–5.1)
PROT SERPL-MCNC: 7.2 G/DL (ref 6–8.4)
SODIUM SERPL-SCNC: 138 MMOL/L (ref 136–145)

## 2023-06-29 PROCEDURE — 80053 COMPREHEN METABOLIC PANEL: CPT | Performed by: STUDENT IN AN ORGANIZED HEALTH CARE EDUCATION/TRAINING PROGRAM

## 2023-06-29 PROCEDURE — 99232 PR SUBSEQUENT HOSPITAL CARE,LEVL II: ICD-10-PCS | Mod: ,,, | Performed by: STUDENT IN AN ORGANIZED HEALTH CARE EDUCATION/TRAINING PROGRAM

## 2023-06-29 PROCEDURE — 36415 COLL VENOUS BLD VENIPUNCTURE: CPT | Performed by: STUDENT IN AN ORGANIZED HEALTH CARE EDUCATION/TRAINING PROGRAM

## 2023-06-29 PROCEDURE — 25000003 PHARM REV CODE 250: Performed by: PHYSICIAN ASSISTANT

## 2023-06-29 PROCEDURE — S4991 NICOTINE PATCH NONLEGEND: HCPCS | Performed by: PSYCHIATRY & NEUROLOGY

## 2023-06-29 PROCEDURE — 11400000 HC PSYCH PRIVATE ROOM

## 2023-06-29 PROCEDURE — 90833 PR PSYCHOTHERAPY W/PATIENT W/E&M, 30 MIN (ADD ON): ICD-10-PCS | Mod: ,,, | Performed by: STUDENT IN AN ORGANIZED HEALTH CARE EDUCATION/TRAINING PROGRAM

## 2023-06-29 PROCEDURE — 99232 SBSQ HOSP IP/OBS MODERATE 35: CPT | Mod: ,,, | Performed by: STUDENT IN AN ORGANIZED HEALTH CARE EDUCATION/TRAINING PROGRAM

## 2023-06-29 PROCEDURE — 25000003 PHARM REV CODE 250: Performed by: STUDENT IN AN ORGANIZED HEALTH CARE EDUCATION/TRAINING PROGRAM

## 2023-06-29 PROCEDURE — 25000003 PHARM REV CODE 250: Performed by: PSYCHIATRY & NEUROLOGY

## 2023-06-29 PROCEDURE — 90833 PSYTX W PT W E/M 30 MIN: CPT | Mod: ,,, | Performed by: STUDENT IN AN ORGANIZED HEALTH CARE EDUCATION/TRAINING PROGRAM

## 2023-06-29 RX ORDER — ADHESIVE BANDAGE
30 BANDAGE TOPICAL DAILY PRN
Status: DISCONTINUED | OUTPATIENT
Start: 2023-06-29 | End: 2023-07-03 | Stop reason: HOSPADM

## 2023-06-29 RX ADMIN — CLONAZEPAM 1 MG: 1 TABLET ORAL at 08:06

## 2023-06-29 RX ADMIN — AMLODIPINE BESYLATE 5 MG: 5 TABLET ORAL at 08:06

## 2023-06-29 RX ADMIN — NICOTINE 1 PATCH: 14 PATCH, EXTENDED RELEASE TRANSDERMAL at 02:06

## 2023-06-29 RX ADMIN — ARIPIPRAZOLE 10 MG: 10 TABLET ORAL at 08:06

## 2023-06-29 RX ADMIN — MAGNESIUM HYDROXIDE 2400 MG: 400 SUSPENSION ORAL at 08:06

## 2023-06-29 RX ADMIN — CLONAZEPAM 1 MG: 1 TABLET ORAL at 02:06

## 2023-06-29 NOTE — NURSING
Pt sleeping at this time, slept 8.5 hrs with no awakenings. NAD. Resp even and unlabored.Pathways clear,bed in low position. Q 15 min safety check ongoing.All precautions maintained.

## 2023-06-29 NOTE — PLAN OF CARE
"Pt reports feeling "a little better, but still anxious" today, spending majority of time in room with minimal interaction with staff and peers. Denies SI/HI. Denies hallucinations at this time. Appetite adequate. Denies sleep disturbances. Medication compliant. NADN. Remains calm and cooperative. Safety precautions remain in place.  " 65.9

## 2023-06-29 NOTE — PLAN OF CARE
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 to seek substance abuse rehab or out patient treatment if pt desires. [facility] will assist in follow up care planning.

## 2023-06-29 NOTE — MEDICAL/APP STUDENT
"PSYCHIATRY DAILY INPATIENT PROGRESS NOTE  SUBSEQUENT HOSPITAL VISIT    ENCOUNTER DATE: 6/29/2023  SITE: [facility] [place]    DATE OF ADMISSION: 6/26/2023 11:54 PM  LENGTH OF STAY: 3 days      CHIEF COMPLAINT   Richie Zaman is a 38 y.o. male, seen during daily morales rounds on the inpatient unit.  Richie Zaman presented with the chief complaint of depression and psychosis      The patient was seen and examined. The chart was reviewed.     Reviewed notes from Rns and MD and labs from the last 24 hours.    The patient's case was discussed with the treatment team/care providers today including MD    Staff reports no behavioral or management issues.     The patient has been compliant with treatment.      Subjective 06/29/2023       Today the patient reports his mood is "ok" feeling better than yesterday. He talked to his mother on the phone yesterday. States he is trying to sleep as much as possible so he is well rested when he is discharged. Endorses cravings for cigarettes, but denies cravings for adderall. Reports he is feeling less anxious with medications.      The patient denies any side effects to medications.        Interim/overnight events per report/notes:          Psychiatric ROS (observed, reported, or endorsed/denied):  Depressed mood - less  Interest/pleasure/anhedonia: denies  Guilt/hopelessness/worthlessness - denies  Changes in Sleep - fluctuating  Changes in Appetite - improving minimally  Changes in Concentration - fluctuating  Changes in Energy - fluctuating  PMA/R- fluctuating  Suicidal- active/passive ideations - denies  Homicidal ideations: active/passive ideations - denies    Hallucinations - No  Delusions - No  Disorganized behavior - No  Disorganized speech - No  Negative symptoms - No    Elevated mood - No  Decreased need for sleep - No  Grandiosity - No  Racing thoughts - No  Impulsivity - No  Irritability- No  Increased energy - No  Distractibility - No  Increase in goal-directed activity or " "PMA- No    Symptoms of OK - improving minimally  Symptoms of Panic Disorder- fluctuating  Symptoms of PTSD - No        Overall progress: Patient is showing mild improvement             Medical ROS  Review of Systems   Constitutional: Negative.    HENT: Negative.     Eyes: Negative.    Respiratory: Negative.     Cardiovascular: Negative.    Gastrointestinal: Negative.    Genitourinary: Negative.    Musculoskeletal: Negative.    Skin: Negative.    Neurological: Negative.        PAST MEDICAL HISTORY   Past Medical History:   Diagnosis Date    Bipolar 1 disorder     Methamphetamine abuse            PSYCHOTROPIC MEDICATIONS   Scheduled Meds:   amLODIPine  5 mg Oral Daily    ARIPiprazole  10 mg Oral Daily    clonazePAM  1 mg Oral TID     Continuous Infusions:  PRN Meds:.acetaminophen, aluminum-magnesium hydroxide-simethicone, benzonatate, benztropine mesylate, hydrOXYzine pamoate, loperamide, nicotine, OLANZapine **AND** OLANZapine, ondansetron, promethazine        EXAMINATION    VITALS   Vitals:    06/28/23 0810 06/28/23 1914 06/29/23 0739 06/29/23 0941   BP:  (!) 135/91 132/86    BP Location:  Right arm Right arm    Patient Position:  Sitting Sitting    Pulse:  95 91    Resp:  18 18    Temp:  98.1 °F (36.7 °C) 97.9 °F (36.6 °C) 97.9 °F (36.6 °C)   TempSrc:   Temporal    SpO2:       Weight: 120.7 kg (265 lb 15.8 oz)   120.7 kg (266 lb 1.5 oz)   Height:    6' 2" (1.88 m)       Body mass index is 34.16 kg/m².        CONSTITUTIONAL  General Appearance: unremarkable, age appropriate    MUSCULOSKELETAL  Muscle Strength and Tone:no tremor, no tic  Abnormal Involuntary Movements: No  Gait and Station: non-ataxic    PSYCHIATRIC   Level of Consciousness: awake and alert   Orientation: person, place, and situation  Grooming: Casually dressed and Well groomed  Psychomotor Behavior: normal, cooperative, eye contact normal  Speech: normal tone, normal rate, normal pitch, normal volume  Language: grossly intact  Mood: fine  Affect: " Anxious and Consistent with mood  Thought Process: linear, logical  Associations: intact   Thought Content: denies SI and denies HI  Perceptions: denies AH and denies  VH  Memory: Able to recall past events, Remote intact, and Recent intact  Attention:Attends to interview without distraction  Fund of Knowledge: Aware of current events and Vocabulary appropriate   Estimate if Intelligence:  Average based on work/education history, vocabulary and mental status exam  Insight: has awareness of illness  Judgment: behavior is adequate to circumstances        DIAGNOSTIC TESTING   Laboratory Results  Recent Results (from the past 24 hour(s))   Comprehensive metabolic panel    Collection Time: 06/29/23  6:35 AM   Result Value Ref Range    Sodium 138 136 - 145 mmol/L    Potassium 3.8 3.5 - 5.1 mmol/L    Chloride 104 95 - 110 mmol/L    CO2 23 23 - 29 mmol/L    Glucose 90 70 - 110 mg/dL    BUN 20 6 - 20 mg/dL    Creatinine 1.1 0.5 - 1.4 mg/dL    Calcium 9.0 8.7 - 10.5 mg/dL    Total Protein 7.2 6.0 - 8.4 g/dL    Albumin 3.7 3.5 - 5.2 g/dL    Total Bilirubin 0.4 0.1 - 1.0 mg/dL    Alkaline Phosphatase 59 55 - 135 U/L    AST 16 10 - 40 U/L    ALT 12 10 - 44 U/L    eGFR >60 >60 mL/min/1.73 m^2    Anion Gap 11 8 - 16 mmol/L             MEDICAL DECISION MAKING      ASSESSMENT:   Bipolar disorder, type II, MRE depressed, severe with psychotic features  OK  Panic disorder  Amphetamine abuse  Nicotine dependence  Psychosocial stressors     SANTI  HTN         PROBLEM LIST AND MANAGEMENT PLANS    Bipolar disorder, type II, MRE depressed, severe with psychotic features  - stop risperdal, wellbutrin  - started/continue abilify 10 mg PO qd today  - pt counseled  - follow up with outpatient mental health providers after discharge for medication management and psychotherapy     OK  - increase/continue home dose of klonopin to 1 mg PO TID  - pt counseled  - follow up with outpatient mental health providers after discharge for medication  management and psychotherapy     Panic disorder  - increase/continue home dose of klonopin to 1 mg PO TID  - pt counseled  - follow up with outpatient mental health providers after discharge for medication management and psychotherapy     Amphetamine abuse  - SW consulted for assistance with additional resources   - pt counseled     Nicotine dependence  - start nicotine patch 14 mg PO qd PRN     Psychosocial stressors  - pt counseled  - SW consulted for assistance with additional resources         SANTI  - FM consulted  - recheck CMP tomorrow  - resolved; Cr 1.1 today    HTN  - FM consulted, started norvasc  - BP improving, continue norvasc 5 mg qd               Discussed diagnosis, risks and benefits of proposed treatment vs alternative treatments vs no treatment, potential side effects of these treatments and the inherent unpredictability of treatment. The patient expresses understanding of the above and displays the capacity to agree with this treatment given said understanding. Patient also agrees that, currently, the benefits outweigh the risks and would like to pursue/continue treatment at this time.    Any medications being used off-label were discussed with the patient inclusive of the evidence base for the use of the medications and consent was obtained for the off-label use of the medication.       DISCHARGE PLANNING  Expected Disposition Plan: Home or Self Care      NEED FOR CONTINUED HOSPITALIZATION  Psychiatric illness continues to pose a potential threat to life or bodily function, of self or others, thereby requiring the need for continued inpatient psychiatric hospitalization: Yes, due to: danger to self, danger to others, and gravely disabled, as evidenced by:  Ongoing concerns with perceptual aberrancy and paranoid persecutory delusions leading to potential harm of self or others.    Protective inpatient pyschiatric hospitalization required while a safe disposition plan is enacted: Yes    Patient  stabilized and ready for discharge from inpatient psychiatric unit: No          Samanta Pierre, MS3
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
## 2023-06-29 NOTE — PLAN OF CARE
Class I on average physical exertion for his age. Stable from a cardiac standpoint. Rare use of nitroglycerin patch. Continue on Metoprolol and statin therapy.   Nutrition Plan of Care:       Recommendations     1. Continue to encourage po intake   2. Collaboration with medical providers     Goals: Patient to continue to consume adequate po intake prior to RD follow ups.  Nutrition Goal Status: goal met  Communication of RD Recs: other (comment) (consult, poc)     Assessment and Plan     No nutritional problem at this time.  RD to continue to monitor for nutritional risks at follow ups.       Malnutrition Assessment    Patient does not meet positive criteria for NFPE at this time.  RD to continue to monitor at follow ups.    Patient is eating 100% of meals and snacks with no tolerance issues or complaints.      Ghada Graham, MS, RDN, LDN

## 2023-06-29 NOTE — PROGRESS NOTES
PSYCHIATRY DAILY INPATIENT PROGRESS NOTE  SUBSEQUENT HOSPITAL VISIT    ENCOUNTER DATE: 6/29/2023  SITE: [facility]    DATE OF ADMISSION: 6/26/2023 11:54 PM  LENGTH OF STAY: 3 days      CHIEF COMPLAINT   Richie Zaman is a 38 y.o. male, seen during daily morales rounds on the inpatient unit.  Richie Zaman presented with the chief complaint of depression and psychosis      The patient was seen and examined. The chart was reviewed.     Reviewed notes from Rns, RD, and LPC and labs from the last 24 hours.    The patient's case was discussed with the treatment team/care providers today including Rns    Staff reports no behavioral or management issues.     The patient has been compliant with treatment.      Subjective 06/29/2023       Today patient states he is feeling much better. He feels medications are helping. Discussed mental health coping skills and motivation for outpatient treatment. He is willing to engage in behavioral activation today with some encouragement.     The patient denies any side effects to medications.        Interim/overnight events per report/notes:  Per RN:  Pt is somewhat subdued, still slightly sedated.  Resting in his room a lot.  Pt encouraged to drink po fluids and increase activity as tolerated, understanding verbalized.          Psychiatric ROS (observed, reported, or endorsed/denied):  Depressed mood - less  Interest/pleasure/anhedonia: less  Guilt/hopelessness/worthlessness - less  Changes in Sleep - less  Changes in Appetite - less  Changes in Concentration - less  Changes in Energy - less  PMA/R- less  Suicidal- active/passive ideations - less  Homicidal ideations: active/passive ideations - No    Hallucinations - less  Delusions - less  Disorganized behavior - No  Disorganized speech - No  Negative symptoms - fluctuating    Elevated mood - No  Decreased need for sleep - No  Grandiosity - No  Racing thoughts - No  Impulsivity - No  Irritability- No  Increased energy -  No  Distractibility - No  Increase in goal-directed activity or PMA- No    Symptoms of OK - less  Symptoms of Panic Disorder- less  Symptoms of PTSD - No        Overall progress: Patient is showing mild improvement         Psychotherapy:  Target symptoms: substance abuse  Why chosen therapy is appropriate versus another modality: relevant to diagnosis  Outcome monitoring methods: self-report  Therapeutic intervention type: supportive psychotherapy  Topics discussed/themes: building skills sets for symptom management, symptom recognition  The patient's response to the intervention is accepting. The patient's progress toward treatment goals is fair.   Duration of intervention: 16 minutes.        Medical ROS  Review of Systems   Constitutional:  Negative for chills and fever.   HENT:  Negative for hearing loss and tinnitus.    Eyes:  Negative for blurred vision and double vision.   Respiratory:  Negative for shortness of breath.    Cardiovascular:  Negative for chest pain and palpitations.   Gastrointestinal:  Negative for constipation, diarrhea, nausea and vomiting.   Genitourinary:  Negative for dysuria.   Musculoskeletal:  Negative for back pain and neck pain.   Skin:  Negative for rash.   Neurological:  Negative for dizziness and headaches.   Endo/Heme/Allergies:  Negative for environmental allergies.       PAST MEDICAL HISTORY   Past Medical History:   Diagnosis Date    Bipolar 1 disorder     Methamphetamine abuse            PSYCHOTROPIC MEDICATIONS   Scheduled Meds:   amLODIPine  5 mg Oral Daily    ARIPiprazole  10 mg Oral Daily    clonazePAM  1 mg Oral TID     Continuous Infusions:  PRN Meds:.acetaminophen, aluminum-magnesium hydroxide-simethicone, benzonatate, benztropine mesylate, hydrOXYzine pamoate, loperamide, nicotine, OLANZapine **AND** OLANZapine, ondansetron, promethazine        EXAMINATION    VITALS   Vitals:    06/28/23 0810 06/28/23 1914 06/29/23 0739 06/29/23 0941   BP:  (!) 135/91 132/86    BP  "Location:  Right arm Right arm    Patient Position:  Sitting Sitting    Pulse:  95 91    Resp:  18 18    Temp:  98.1 °F (36.7 °C) 97.9 °F (36.6 °C) 97.9 °F (36.6 °C)   TempSrc:   Temporal    SpO2:       Weight: 120.7 kg (265 lb 15.8 oz)   120.7 kg (266 lb 1.5 oz)   Height:    6' 2" (1.88 m)       Body mass index is 34.16 kg/m².        CONSTITUTIONAL  General Appearance: unremarkable, age appropriate    MUSCULOSKELETAL  Muscle Strength and Tone:no tremor, no tic  Abnormal Involuntary Movements: No  Gait and Station: non-ataxic    PSYCHIATRIC   Level of Consciousness: awake and alert   Orientation: person, place, and situation  Grooming: Casually dressed and Well groomed  Psychomotor Behavior: normal, cooperative  Speech: normal tone, normal rate, normal pitch, normal volume  Language: grossly intact  Mood: fine  Affect: Consistent with mood  Thought Process: linear, logical  Associations: intact   Thought Content: less SI, less delusions, and denies HI  Perceptions: less AH and denies  VH  Memory: Able to recall past events, Remote intact, and Recent intact  Attention:Attends to interview without distraction  Fund of Knowledge: Aware of current events and Vocabulary appropriate   Estimate if Intelligence:  Average based on work/education history, vocabulary and mental status exam  Insight: has awareness of illness  Judgment: behavior is adequate to circumstances        DIAGNOSTIC TESTING   Laboratory Results  Recent Results (from the past 24 hour(s))   Comprehensive metabolic panel    Collection Time: 06/29/23  6:35 AM   Result Value Ref Range    Sodium 138 136 - 145 mmol/L    Potassium 3.8 3.5 - 5.1 mmol/L    Chloride 104 95 - 110 mmol/L    CO2 23 23 - 29 mmol/L    Glucose 90 70 - 110 mg/dL    BUN 20 6 - 20 mg/dL    Creatinine 1.1 0.5 - 1.4 mg/dL    Calcium 9.0 8.7 - 10.5 mg/dL    Total Protein 7.2 6.0 - 8.4 g/dL    Albumin 3.7 3.5 - 5.2 g/dL    Total Bilirubin 0.4 0.1 - 1.0 mg/dL    Alkaline Phosphatase 59 55 - 135 " U/L    AST 16 10 - 40 U/L    ALT 12 10 - 44 U/L    eGFR >60 >60 mL/min/1.73 m^2    Anion Gap 11 8 - 16 mmol/L              MEDICAL DECISION MAKING          ASSESSMENT         Bipolar disorder, type II, MRE depressed, severe with psychotic features  OK  Panic disorder  Amphetamine abuse  Nicotine dependence  Psychosocial stressors     SANTI  HTN          PROBLEM LIST AND MANAGEMENT PLANS           Bipolar disorder, type II, MRE depressed, severe with psychotic features  - stop risperdal, wellbutrin  - started/continue abilify 10 mg PO qd today  - pt counseled  - follow up with outpatient mental health providers after discharge for medication management and psychotherapy     OK  - increase home dose of klonopin to 1 mg PO TID  - pt counseled  - follow up with outpatient mental health providers after discharge for medication management and psychotherapy     Panic disorder  - increase home dose of klonopin to 1 mg PO TID  - pt counseled  - follow up with outpatient mental health providers after discharge for medication management and psychotherapy     Amphetamine abuse  - SW consulted for assistance with additional resources   - pt counseled     Nicotine dependence  - start nicotine patch 14 mg PO qd PRN     Psychosocial stressors  - pt counseled  - SW consulted for assistance with additional resources         SANTI  - FM consulted  - CMP reviewed today, CR improved to WNL    HTN  - FM consulted, started norvasc                 Discussed diagnosis, risks and benefits of proposed treatment vs alternative treatments vs no treatment, potential side effects of these treatments and the inherent unpredictability of treatment. The patient expresses understanding of the above and displays the capacity to agree with this treatment given said understanding. Patient also agrees that, currently, the benefits outweigh the risks and would like to pursue/continue treatment at this time.    Any medications being used off-label were  discussed with the patient inclusive of the evidence base for the use of the medications and consent was obtained for the off-label use of the medication.       DISCHARGE PLANNING  Expected Disposition Plan: Home or Self Care      NEED FOR CONTINUED HOSPITALIZATION  Psychiatric illness continues to pose a potential threat to life or bodily function, of self or others, thereby requiring the need for continued inpatient psychiatric hospitalization: Yes, due to: danger to self, danger to others, and gravely disabled, as evidenced by:  Ongoing concerns with perceptual aberrancy and paranoid persecutory delusions leading to potential harm of self or others.    Protective inpatient pyschiatric hospitalization required while a safe disposition plan is enacted: Yes    Patient stabilized and ready for discharge from inpatient psychiatric unit: No        STAFF:   Mohit Tyson III, MD  Psychiatry

## 2023-06-29 NOTE — PLAN OF CARE
Pt calm and cooperative, denies SI at this time, pt showered and had snack, avoiding social contact, isolating to room, appetite good, med compliant, safety precautions maintained, will continue to monitor

## 2023-06-29 NOTE — CONSULTS
[facility]  Adult Nutrition  Consult Note    SUMMARY     Recommendations    1. Continue to encourage po intake   2. Collaboration with medical providers    Goals: Patient to continue to consume adequate po intake prior to RD follow ups.  Nutrition Goal Status: goal met  Communication of RD Recs: other (comment) (consult, poc)    Assessment and Plan    No nutritional problem at this time.  RD to continue to monitor for nutritional risks at follow ups.      Malnutrition Assessment       Patient does not meet positive criteria for NFPE at this time.  RD to continue to monitor at follow ups.    Patient is eating 100% of meals and snacks with no tolerance issues or complaints.                                Reason for Assessment    Reason For Assessment: consult (New Admit)    Diagnosis: psychological disorder (hallucinations and thoughts of death/suicide)  Patient Active Problem List   Diagnosis    Depression with suicidal ideation    SANTI (acute kidney injury)    Amphetamine use    Leukocytosis    Essential hypertension    Tobacco use disorder       Relevant Medical History: past psychiatric history of Substance Abuse and bipolar disorder    Interdisciplinary Rounds: did not attend (RD remote)    General Information Comments:   6/29/2023: Patient is on a regular diet with double portions.  Meal consumption noted at 100%.  Nurse reported good appetite and po intake.  No tolerance issues at this time.  Labs reviewed.  NKFA.  LBM:  RD to continue to monitor po intake and tolerance at follow ups.  Patient with no nutritional risk at this time.    Nutrition Discharge Planning: Patient to continue on recommended regular diet post discharge.    Nutrition Risk Screen    Nutrition Risk Screen: no indicators present    Nutrition/Diet History    Spiritual, Cultural Beliefs, Temple Practices, Values that Affect Care: no  Food Allergies: NKFA    Anthropometrics    Temp: 97.9 °F (36.6 °C)  Height Method:  "Stated  Height: 6' 2" (188 cm)  Height (inches): 74 in  Weight Method: Standard Scale  Weight: 120.7 kg (266 lb 1.5 oz)  Weight (lb): 266.1 lb  Ideal Body Weight (IBW), Male: 190 lb  % Ideal Body Weight, Male (lb): 140.05 %  BMI (Calculated): 34.2  BMI Grade: 30 - 34.9- obesity - grade I       Lab/Procedures/Meds    Pertinent Labs Reviewed: reviewed  BMP  Lab Results   Component Value Date     06/29/2023    K 3.8 06/29/2023     06/29/2023    CO2 23 06/29/2023    BUN 20 06/29/2023    CREATININE 1.1 06/29/2023    CALCIUM 9.0 06/29/2023    ANIONGAP 11 06/29/2023    EGFRNORACEVR >60 06/29/2023     Lab Results   Component Value Date    HGBA1C 5.4 06/26/2023       Lab Results   Component Value Date    CALCIUM 9.0 06/29/2023       Pertinent Medications Reviewed: reviewed  Scheduled Meds:   amLODIPine  5 mg Oral Daily    ARIPiprazole  10 mg Oral Daily    clonazePAM  1 mg Oral TID     Continuous Infusions:  PRN Meds:.acetaminophen, aluminum-magnesium hydroxide-simethicone, benzonatate, benztropine mesylate, hydrOXYzine pamoate, loperamide, nicotine, OLANZapine **AND** OLANZapine, ondansetron, promethazine    Physical Findings/Assessment         Estimated/Assessed Needs    Weight Used For Calorie Calculations: 120.7 kg (266 lb 1.5 oz)  Energy Calorie Requirements (kcal): 15-20kcals/kg (1810-2414kcals/day)  Energy Need Method: Kcal/kg  Protein Requirements: 2.0g X IBW (86kg)=172g/day  Weight Used For Protein Calculations: 120.7 kg (266 lb 1.5 oz)  Fluid Requirements (mL): 1mL/kcal  Estimated Fluid Requirement Method: RDA Method  RDA Method (mL): 15  CHO Requirement: 250      Nutrition Prescription Ordered    Current Diet Order: Regular    Evaluation of Received Nutrient/Fluid Intake    % Kcal Needs: 100%  % Protein Needs: 100%  I/O: LAWRENCE  Energy Calories Required: meeting needs  Protein Required: meeting needs  Fluid Required: meeting needs  Comments: LBM: LAWRENCE  Tolerance: tolerating  % Intake of Estimated Energy " Needs: 75 - 100 %  % Meal Intake: 75 - 100 %    Nutrition Risk    Level of Risk/Frequency of Follow-up: low       Monitor and Evaluation    Food and Nutrient Intake: energy intake, food and beverage intake  Food and Nutrient Adminstration: diet order  Physical Activity and Function: nutrition-related ADLs and IADLs, factors affecting access to physical activity  Anthropometric Measurements: height/length, weight, weight change, body mass index  Biochemical Data, Medical Tests and Procedures: electrolyte and renal panel, gastrointestinal profile, glucose/endocrine profile, inflammatory profile, lipid profile       Nutrition Follow-Up    RD Follow-up?: Yes  Ghada Graham, MS, RDN, LDN

## 2023-06-30 PROCEDURE — 25000003 PHARM REV CODE 250: Performed by: PHYSICIAN ASSISTANT

## 2023-06-30 PROCEDURE — 99232 PR SUBSEQUENT HOSPITAL CARE,LEVL II: ICD-10-PCS | Mod: ,,, | Performed by: STUDENT IN AN ORGANIZED HEALTH CARE EDUCATION/TRAINING PROGRAM

## 2023-06-30 PROCEDURE — S4991 NICOTINE PATCH NONLEGEND: HCPCS | Performed by: PSYCHIATRY & NEUROLOGY

## 2023-06-30 PROCEDURE — 90833 PSYTX W PT W E/M 30 MIN: CPT | Mod: ,,, | Performed by: STUDENT IN AN ORGANIZED HEALTH CARE EDUCATION/TRAINING PROGRAM

## 2023-06-30 PROCEDURE — 25000003 PHARM REV CODE 250: Performed by: STUDENT IN AN ORGANIZED HEALTH CARE EDUCATION/TRAINING PROGRAM

## 2023-06-30 PROCEDURE — 25000003 PHARM REV CODE 250: Performed by: PSYCHIATRY & NEUROLOGY

## 2023-06-30 PROCEDURE — 90833 PR PSYCHOTHERAPY W/PATIENT W/E&M, 30 MIN (ADD ON): ICD-10-PCS | Mod: ,,, | Performed by: STUDENT IN AN ORGANIZED HEALTH CARE EDUCATION/TRAINING PROGRAM

## 2023-06-30 PROCEDURE — 11400000 HC PSYCH PRIVATE ROOM

## 2023-06-30 PROCEDURE — 99232 SBSQ HOSP IP/OBS MODERATE 35: CPT | Mod: ,,, | Performed by: STUDENT IN AN ORGANIZED HEALTH CARE EDUCATION/TRAINING PROGRAM

## 2023-06-30 RX ORDER — ARIPIPRAZOLE 5 MG/1
5 TABLET ORAL ONCE
Status: COMPLETED | OUTPATIENT
Start: 2023-06-30 | End: 2023-06-30

## 2023-06-30 RX ADMIN — CLONAZEPAM 1 MG: 1 TABLET ORAL at 08:06

## 2023-06-30 RX ADMIN — CLONAZEPAM 1 MG: 1 TABLET ORAL at 02:06

## 2023-06-30 RX ADMIN — ARIPIPRAZOLE 10 MG: 10 TABLET ORAL at 08:06

## 2023-06-30 RX ADMIN — NICOTINE 1 PATCH: 14 PATCH, EXTENDED RELEASE TRANSDERMAL at 08:06

## 2023-06-30 RX ADMIN — AMLODIPINE BESYLATE 5 MG: 5 TABLET ORAL at 08:06

## 2023-06-30 RX ADMIN — ARIPIPRAZOLE 5 MG: 5 TABLET ORAL at 12:06

## 2023-06-30 NOTE — NURSING
Pt sleeping at this time, slept 8 hrs with 1 awakenings. NAD. Resp even and unlabored.Pathways clear,bed in low position. Q 15 min safety check ongoing.All precautions maintained.

## 2023-06-30 NOTE — PLAN OF CARE
Problem: Adult Behavioral Health Plan of Care  Goal: Rounds/Family Conference  Outcome: Ongoing, Progressing  Flowsheets (Taken 6/30/2023 1045)  Participants:   psychiatrist   nursing   other ([facility])   TREATMENT TEAM      Chief Complaint:    Pt is a 38 year old  male with chief complaints of depression and psychosis  Pt states he was under alot for stress and was hearing a lot of voices which were strange. Pt states he was non-compliant with his medications and was coming into the hospital to get help.       Pt Goal(s):    Pt states when he will be discharged he wants to get a job.     Current Progress:   Pt attended treatment team dressed in blue scrubs    Pt affect consistent with mood/depressed mood  Pt states he is feeling that his medications are working fine.   Pt states he was having confusions about taking his medications.  Pt denies SI/HI  Program/groups:  Encourage pt to continue to attend all groups.       Revisions to Plan:  Encourage pt to attend treatment team and to attend all groups. Recommendations upon discharge are for pt to attend outpatient treatment.

## 2023-06-30 NOTE — PLAN OF CARE
Pt is calm and cooperative.  Was expecting to be discharged today, but he accepted that he wasn't being discharged very well.  States he is ready to see his family but understands the doctors reasoning for keeping him.  Pt is compliant with treatment.  Still tired and sleeping a lot during the day.  Did not attend SW group this afternoon.  Pt denies any S/I or H/I and states he believes his mood is back to normal.  No acute distress apparent at this time, will continue to monitor.

## 2023-06-30 NOTE — PLAN OF CARE
Problem: Adult Behavioral Health Plan of Care  Goal: Optimized Coping Skills in Response to Life Stressors  Outcome: Ongoing, Progressing      GROUP NOTE:   Pt did not attend group today. [facility] met with pt individually.  [facility] attempted to  discuss with pt on group discussion. Pt was resting in bed quietly with covers over body. [facility] discussed with pt [facility] will come back at a later time and date to discuss group.

## 2023-06-30 NOTE — PSYCH
Pt wife Joan contacted [facility] to discuss pt and his discharge. Wife states pt told her he will be released today and wanted to know if that ws true. [facility] stated she is not certain if pt will be discharged today or not, but will discuss it with the DrAnushka As soon as they get in. Wife states pt contacted her last night and she thinks he is minimizing what he is feeling and is still confused within his thoughts. Wife states he is hiding it in order to come home. She also states he has done this many times in the past and has come home and they were back at square one with medications not working. Wife also states pt has contacted his mother and his mother feels the same way. Wife states it is not that they do not want him to come home, but thinks he needs time to see if the medications are going to work for him.

## 2023-06-30 NOTE — PROGRESS NOTES
PSYCHIATRY DAILY INPATIENT PROGRESS NOTE  SUBSEQUENT HOSPITAL VISIT    ENCOUNTER DATE: 6/30/2023  SITE: [facility]    DATE OF ADMISSION: 6/26/2023 11:54 PM  LENGTH OF STAY: 4 days      CHIEF COMPLAINT   Richie Zaman is a 38 y.o. male, seen during daily morales rounds on the inpatient unit.  Richie Zaman presented with the chief complaint of depression and psychosis      The patient was seen and examined. The chart was reviewed.     Reviewed notes from Rns, RD, and LPC and labs from the last 24 hours.    The patient's case was discussed with the treatment team/care providers today including Rns    Staff reports no behavioral or management issues.     The patient has been compliant with treatment.      Subjective 06/30/2023       Collateral concerning, reviewed report with patient. He states he does not remember psychosis that his wife is reporting. He is very guarded and does appear to be minimizing somewhat. Counseled patient extensively on adderall abuse.    The patient denies any side effects to medications.        Interim/overnight events per report/notes:  Per RN:  Pt is somewhat subdued, still slightly sedated.  Resting in his room a lot.  Pt encouraged to drink po fluids and increase activity as tolerated, understanding verbalized.          Psychiatric ROS (observed, reported, or endorsed/denied):  Depressed mood - less  Interest/pleasure/anhedonia: less  Guilt/hopelessness/worthlessness - less  Changes in Sleep - less  Changes in Appetite - less  Changes in Concentration - less  Changes in Energy - less  PMA/R- less  Suicidal- active/passive ideations - less  Homicidal ideations: active/passive ideations - No    Hallucinations - less  Delusions - less  Disorganized behavior - No  Disorganized speech - No  Negative symptoms - fluctuating    Elevated mood - No  Decreased need for sleep - No  Grandiosity - No  Racing thoughts - No  Impulsivity - No  Irritability- No  Increased energy - No  Distractibility -  No  Increase in goal-directed activity or PMA- No    Symptoms of OK - less  Symptoms of Panic Disorder- less  Symptoms of PTSD - No        Overall progress: Patient is showing mild improvement         Psychotherapy:  Target symptoms: substance abuse  Why chosen therapy is appropriate versus another modality: relevant to diagnosis  Outcome monitoring methods: self-report  Therapeutic intervention type: supportive psychotherapy  Topics discussed/themes: building skills sets for symptom management, symptom recognition, substance abuse  The patient's response to the intervention is accepting. The patient's progress toward treatment goals is fair.   Duration of intervention: 16 minutes.        Medical ROS  Review of Systems   Constitutional:  Negative for chills and fever.   HENT:  Negative for hearing loss and tinnitus.    Eyes:  Negative for blurred vision and double vision.   Respiratory:  Negative for shortness of breath.    Cardiovascular:  Negative for chest pain and palpitations.   Gastrointestinal:  Negative for constipation, diarrhea, nausea and vomiting.   Genitourinary:  Negative for dysuria.   Musculoskeletal:  Negative for back pain and neck pain.   Skin:  Negative for rash.   Neurological:  Negative for dizziness and headaches.   Endo/Heme/Allergies:  Negative for environmental allergies.       PAST MEDICAL HISTORY   Past Medical History:   Diagnosis Date    Bipolar 1 disorder     Hypertension     Methamphetamine abuse            PSYCHOTROPIC MEDICATIONS   Scheduled Meds:   amLODIPine  5 mg Oral Daily    ARIPiprazole  10 mg Oral Daily    clonazePAM  1 mg Oral TID     Continuous Infusions:  PRN Meds:.acetaminophen, aluminum-magnesium hydroxide-simethicone, benzonatate, benztropine mesylate, hydrOXYzine pamoate, loperamide, magnesium hydroxide 400 mg/5 ml, nicotine, OLANZapine **AND** OLANZapine, ondansetron, promethazine        EXAMINATION    VITALS   Vitals:    06/29/23 0739 06/29/23 0941 06/29/23 2006  "06/30/23 0728   BP: 132/86  (!) 142/94 (!) 142/84   BP Location: Right arm  Right arm Left arm   Patient Position: Sitting  Sitting Lying   Pulse: 91  (!) 113 (!) 111   Resp: 18  20 20   Temp: 97.9 °F (36.6 °C) 97.9 °F (36.6 °C) 96.5 °F (35.8 °C) 98.2 °F (36.8 °C)   TempSrc: Temporal  Temporal Temporal   SpO2:       Weight:  120.7 kg (266 lb 1.5 oz)     Height:  6' 2" (1.88 m)         Body mass index is 34.16 kg/m².        CONSTITUTIONAL  General Appearance: unremarkable, age appropriate    MUSCULOSKELETAL  Muscle Strength and Tone:no tremor, no tic  Abnormal Involuntary Movements: No  Gait and Station: non-ataxic    PSYCHIATRIC   Level of Consciousness: awake and alert   Orientation: person, place, and situation  Grooming: Casually dressed and Well groomed  Psychomotor Behavior: normal, cooperative  Speech: normal tone, normal rate, normal pitch, normal volume  Language: grossly intact  Mood: fine  Affect: Consistent with mood  Thought Process: linear, logical  Associations: intact   Thought Content: less SI, less delusions, and denies HI  Perceptions: less AH and denies  VH  Memory: Able to recall past events, Remote intact, and Recent intact  Attention:Attends to interview without distraction  Fund of Knowledge: Aware of current events and Vocabulary appropriate   Estimate if Intelligence:  Average based on work/education history, vocabulary and mental status exam  Insight: has awareness of illness  Judgment: behavior is adequate to circumstances        DIAGNOSTIC TESTING   Laboratory Results  No results found for this or any previous visit (from the past 24 hour(s)).             MEDICAL DECISION MAKING          ASSESSMENT         Bipolar disorder, type II, MRE depressed, severe with psychotic features  OK  Panic disorder  Amphetamine abuse  Nicotine dependence  Psychosocial stressors     SANTI  HTN          PROBLEM LIST AND MANAGEMENT PLANS           Bipolar disorder, type II, MRE depressed, severe with psychotic " features  - stop risperdal, wellbutrin  - started/continue abilify 10 mg PO qd  -increase to 15 mg PO qd today  - pt counseled  - follow up with outpatient mental health providers after discharge for medication management and psychotherapy     OK  - increased home dose of klonopin to 1 mg PO TID    - pt counseled  - follow up with outpatient mental health providers after discharge for medication management and psychotherapy     Panic disorder  - increased home dose of klonopin to 1 mg PO TID  - pt counseled  - follow up with outpatient mental health providers after discharge for medication management and psychotherapy     Amphetamine abuse  - SW consulted for assistance with additional resources   - pt counseled     Nicotine dependence  - start nicotine patch 14 mg PO qd PRN     Psychosocial stressors  - pt counseled  - SW consulted for assistance with additional resources         SANTI  - FM consulted  - CMP reviewed today, CR improved to WNL    HTN  - FM consulted, started norvasc                 Discussed diagnosis, risks and benefits of proposed treatment vs alternative treatments vs no treatment, potential side effects of these treatments and the inherent unpredictability of treatment. The patient expresses understanding of the above and displays the capacity to agree with this treatment given said understanding. Patient also agrees that, currently, the benefits outweigh the risks and would like to pursue/continue treatment at this time.    Any medications being used off-label were discussed with the patient inclusive of the evidence base for the use of the medications and consent was obtained for the off-label use of the medication.       DISCHARGE PLANNING  Expected Disposition Plan: Home or Self Care      NEED FOR CONTINUED HOSPITALIZATION  Psychiatric illness continues to pose a potential threat to life or bodily function, of self or others, thereby requiring the need for continued inpatient psychiatric  hospitalization: Yes, due to: danger to self, danger to others, and gravely disabled, as evidenced by:  Ongoing concerns with perceptual aberrancy and paranoid persecutory delusions leading to potential harm of self or others.    Protective inpatient pyschiatric hospitalization required while a safe disposition plan is enacted: Yes    Patient stabilized and ready for discharge from inpatient psychiatric unit: No        STAFF:   Mohit Tyson III, MD  Psychiatry

## 2023-06-30 NOTE — PSYCH
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 He is not very far off when he thinks that he works for the government because when he first got out of school he he did have the opportunity to work for government  and [id number], but he never took them because they would have to move a lot and he didn't think that it would be a good idea to move the kids around.

## 2023-06-30 NOTE — PLAN OF CARE
"Pt reports feeling "the same" this evening, spending majority of time in room with minimal interaction with staff and peers. Reports anxiety. Denies SI/HI. Denies hallucinations at this time. Appetite adequate. Denies sleep disturbances. Medication compliant. NADN. Remains calm and cooperative. Safety precautions remain in place.  "

## 2023-07-01 PROCEDURE — 25000003 PHARM REV CODE 250: Performed by: PHYSICIAN ASSISTANT

## 2023-07-01 PROCEDURE — 25000003 PHARM REV CODE 250: Performed by: STUDENT IN AN ORGANIZED HEALTH CARE EDUCATION/TRAINING PROGRAM

## 2023-07-01 PROCEDURE — 99233 PR SUBSEQUENT HOSPITAL CARE,LEVL III: ICD-10-PCS | Mod: ,,, | Performed by: STUDENT IN AN ORGANIZED HEALTH CARE EDUCATION/TRAINING PROGRAM

## 2023-07-01 PROCEDURE — 90833 PR PSYCHOTHERAPY W/PATIENT W/E&M, 30 MIN (ADD ON): ICD-10-PCS | Mod: ,,, | Performed by: STUDENT IN AN ORGANIZED HEALTH CARE EDUCATION/TRAINING PROGRAM

## 2023-07-01 PROCEDURE — 90833 PSYTX W PT W E/M 30 MIN: CPT | Mod: ,,, | Performed by: STUDENT IN AN ORGANIZED HEALTH CARE EDUCATION/TRAINING PROGRAM

## 2023-07-01 PROCEDURE — 25000003 PHARM REV CODE 250: Performed by: PSYCHIATRY & NEUROLOGY

## 2023-07-01 PROCEDURE — 99233 SBSQ HOSP IP/OBS HIGH 50: CPT | Mod: ,,, | Performed by: STUDENT IN AN ORGANIZED HEALTH CARE EDUCATION/TRAINING PROGRAM

## 2023-07-01 PROCEDURE — 11400000 HC PSYCH PRIVATE ROOM

## 2023-07-01 PROCEDURE — S4991 NICOTINE PATCH NONLEGEND: HCPCS | Performed by: PSYCHIATRY & NEUROLOGY

## 2023-07-01 RX ORDER — ARIPIPRAZOLE 10 MG/1
20 TABLET ORAL DAILY
Status: DISCONTINUED | OUTPATIENT
Start: 2023-07-02 | End: 2023-07-03 | Stop reason: HOSPADM

## 2023-07-01 RX ORDER — AMLODIPINE BESYLATE 5 MG/1
10 TABLET ORAL DAILY
Status: DISCONTINUED | OUTPATIENT
Start: 2023-07-02 | End: 2023-07-03 | Stop reason: HOSPADM

## 2023-07-01 RX ADMIN — CLONAZEPAM 1 MG: 1 TABLET ORAL at 02:07

## 2023-07-01 RX ADMIN — NICOTINE 1 PATCH: 14 PATCH, EXTENDED RELEASE TRANSDERMAL at 12:07

## 2023-07-01 RX ADMIN — CLONAZEPAM 1 MG: 1 TABLET ORAL at 08:07

## 2023-07-01 RX ADMIN — ARIPIPRAZOLE 15 MG: 5 TABLET ORAL at 08:07

## 2023-07-01 RX ADMIN — AMLODIPINE BESYLATE 5 MG: 5 TABLET ORAL at 08:07

## 2023-07-01 NOTE — NURSING
Rested quietly with closed eyes and even resp for 8.75 hours.  Modified VC and all precautions maintained.  Pathways clear and bed in low position.

## 2023-07-01 NOTE — PROGRESS NOTES
"PSYCHIATRY DAILY INPATIENT PROGRESS NOTE  SUBSEQUENT HOSPITAL VISIT    ENCOUNTER DATE: 7/1/2023  SITE: [facility] [place]    DATE OF ADMISSION: 6/26/2023 11:54 PM  LENGTH OF STAY: 5 days      CHIEF COMPLAINT   Richie Zaman is a 38 y.o. male, seen during daily morales rounds on the inpatient unit.  Richie Zaman presented with the chief complaint of depression and psychosis      The patient was seen and examined. The chart was reviewed.     Reviewed notes from Rns and labs from the last 24 hours.    The patient's case was discussed with the treatment team/care providers today including Rns    Staff reports no behavioral or management issues.     The patient has been compliant with treatment.      Subjective 07/01/2023       "I am sleeping better, I'm not depressed, I can cope."    He feels medications are helping significantly.    Discussed his goal to return to work and get a job that will help his family move out of [facility]. Explored patient's motivation and potential solutions to his situation. Patient expressed gratitude for his treatment after being commended for his ability.    The patient denies any side effects to medications.        Interim/overnight events per report/notes:  Per RN:  Pt is somewhat subdued, still slightly sedated.  Resting in his room a lot.  Pt encouraged to drink po fluids and increase activity as tolerated, understanding verbalized.          Psychiatric ROS (observed, reported, or endorsed/denied):  Depressed mood - less  Interest/pleasure/anhedonia: less  Guilt/hopelessness/worthlessness - less  Changes in Sleep - less  Changes in Appetite - less  Changes in Concentration - less  Changes in Energy - less  PMA/R- less  Suicidal- active/passive ideations - less  Homicidal ideations: active/passive ideations - No    Hallucinations - less  Delusions - less  Disorganized behavior - No  Disorganized speech - No  Negative symptoms - fluctuating    Elevated mood - No  Decreased need for sleep - " No  Grandiosity - No  Racing thoughts - No  Impulsivity - No  Irritability- No  Increased energy - No  Distractibility - No  Increase in goal-directed activity or PMA- No    Symptoms of OK - less  Symptoms of Panic Disorder- less  Symptoms of PTSD - No        Overall progress: Patient is showing mild improvement         Psychotherapy:  Target symptoms: substance abuse  Why chosen therapy is appropriate versus another modality: relevant to diagnosis  Outcome monitoring methods: self-report  Therapeutic intervention type: supportive psychotherapy  Topics discussed/themes: building skills sets for symptom management, symptom recognition, substance abuse  The patient's response to the intervention is accepting. The patient's progress toward treatment goals is fair.   Duration of intervention: 16 minutes.          Medical ROS  Review of Systems   Constitutional:  Negative for chills and fever.   HENT:  Negative for hearing loss and tinnitus.    Eyes:  Negative for blurred vision and double vision.   Respiratory:  Negative for shortness of breath.    Cardiovascular:  Negative for chest pain and palpitations.   Gastrointestinal:  Negative for constipation, diarrhea, nausea and vomiting.   Genitourinary:  Negative for dysuria.   Musculoskeletal:  Negative for back pain and neck pain.   Skin:  Negative for rash.   Neurological:  Negative for dizziness and headaches.   Endo/Heme/Allergies:  Negative for environmental allergies.       PAST MEDICAL HISTORY   Past Medical History:   Diagnosis Date    Bipolar 1 disorder     Hypertension     Methamphetamine abuse            PSYCHOTROPIC MEDICATIONS   Scheduled Meds:   amLODIPine  5 mg Oral Daily    [START ON 7/2/2023] ARIPiprazole  20 mg Oral Daily    clonazePAM  1 mg Oral TID     Continuous Infusions:  PRN Meds:.acetaminophen, aluminum-magnesium hydroxide-simethicone, benzonatate, benztropine mesylate, hydrOXYzine pamoate, loperamide, magnesium hydroxide 400 mg/5 ml, nicotine,  OLANZapine **AND** OLANZapine, ondansetron, promethazine        EXAMINATION    VITALS   Vitals:    06/29/23 2006 06/30/23 0728 06/30/23 1915 07/01/23 0738   BP: (!) 142/94 (!) 142/84 (!) 136/90 (!) 140/82   BP Location: Right arm Left arm Right arm Left arm   Patient Position: Sitting Lying Sitting Sitting   Pulse: (!) 113 (!) 111 95 89   Resp: 20 20 20 18   Temp: 96.5 °F (35.8 °C) 98.2 °F (36.8 °C) 98.6 °F (37 °C) 97.8 °F (36.6 °C)   TempSrc: Temporal Temporal Temporal Temporal   SpO2:       Weight:       Height:           Body mass index is 34.16 kg/m².        CONSTITUTIONAL  General Appearance: unremarkable, age appropriate    MUSCULOSKELETAL  Muscle Strength and Tone:no tremor, no tic  Abnormal Involuntary Movements: No  Gait and Station: non-ataxic    PSYCHIATRIC   Level of Consciousness: awake and alert   Orientation: person, place, and situation  Grooming: Casually dressed and Well groomed  Psychomotor Behavior: normal, cooperative  Speech: normal tone, normal rate, normal pitch, normal volume  Language: grossly intact  Mood: ok  Affect: Consistent with mood  Thought Process: linear, logical  Associations: intact   Thought Content: less SI, less delusions, and denies HI  Perceptions: less AH and denies  VH  Memory: Able to recall past events, Remote intact, and Recent intact  Attention:Attends to interview without distraction  Fund of Knowledge: Aware of current events and Vocabulary appropriate   Estimate if Intelligence:  Average based on work/education history, vocabulary and mental status exam  Insight: has awareness of illness  Judgment: behavior is adequate to circumstances        DIAGNOSTIC TESTING   Laboratory Results  No results found for this or any previous visit (from the past 24 hour(s)).             MEDICAL DECISION MAKING          ASSESSMENT         Bipolar disorder, type II, MRE depressed, severe with psychotic features  OK  Panic disorder  Amphetamine abuse  Nicotine dependence  Psychosocial  stressors     SANTI  HTN          PROBLEM LIST AND MANAGEMENT PLANS           Bipolar disorder, type II, MRE depressed, severe with psychotic features  - stop risperdal, wellbutrin  - started/continue abilify 10 mg PO qd  -increase to 15 mg PO qd -increase abilify to 20 mg PO qd tomorrow  - pt counseled  - follow up with outpatient mental health providers after discharge for medication management and psychotherapy     OK  - increased home dose of klonopin to 1 mg PO TID    - pt counseled  - follow up with outpatient mental health providers after discharge for medication management and psychotherapy     Panic disorder  - increased home dose of klonopin to 1 mg PO TID  - pt counseled  - follow up with outpatient mental health providers after discharge for medication management and psychotherapy     Amphetamine abuse  - SW consulted for assistance with additional resources   - pt counseled     Nicotine dependence  - start nicotine patch 14 mg PO qd PRN     Psychosocial stressors  - pt counseled  - SW consulted for assistance with additional resources         SANTI  - FM consulted  - CMP reviewed today, CR improved to WNL      HTN  - FM consulted, started norvasc 5 mg PO qd -increase to 10 mg PO qd tomorrow                 Discussed diagnosis, risks and benefits of proposed treatment vs alternative treatments vs no treatment, potential side effects of these treatments and the inherent unpredictability of treatment. The patient expresses understanding of the above and displays the capacity to agree with this treatment given said understanding. Patient also agrees that, currently, the benefits outweigh the risks and would like to pursue/continue treatment at this time.    Any medications being used off-label were discussed with the patient inclusive of the evidence base for the use of the medications and consent was obtained for the off-label use of the medication.       DISCHARGE PLANNING  Expected Disposition Plan: Home or  Self Care      NEED FOR CONTINUED HOSPITALIZATION  Psychiatric illness continues to pose a potential threat to life or bodily function, of self or others, thereby requiring the need for continued inpatient psychiatric hospitalization: Yes, due to: danger to self, danger to others, and gravely disabled, as evidenced by:  Ongoing concerns with perceptual aberrancy and paranoid persecutory delusions leading to potential harm of self or others.    Protective inpatient pyschiatric hospitalization required while a safe disposition plan is enacted: Yes    Patient stabilized and ready for discharge from inpatient psychiatric unit: No        STAFF:   Mohit Tyson III, MD  Psychiatry

## 2023-07-01 NOTE — PLAN OF CARE
Poverty of speech.  Blunted affect.  Goal is to go home and see his family.  Out of room for snacks.  Minimal interaction with peers/staff.  Encouraged increased fluids and regular meals.  VS stable.   Accepted hs medication.  Stressed compliance with medications upon discharge.

## 2023-07-01 NOTE — PLAN OF CARE
Pt still withdrawn/isolative but compliant with treatment.  Denies any S/I or H/I at this time.  Calm and cooperative.  Pt flat and somber.  No acute distress apparent at this time, will continue to monitor.

## 2023-07-02 PROCEDURE — 99233 SBSQ HOSP IP/OBS HIGH 50: CPT | Mod: ,,, | Performed by: STUDENT IN AN ORGANIZED HEALTH CARE EDUCATION/TRAINING PROGRAM

## 2023-07-02 PROCEDURE — 25000003 PHARM REV CODE 250: Performed by: PSYCHIATRY & NEUROLOGY

## 2023-07-02 PROCEDURE — 25000003 PHARM REV CODE 250: Performed by: STUDENT IN AN ORGANIZED HEALTH CARE EDUCATION/TRAINING PROGRAM

## 2023-07-02 PROCEDURE — 99233 PR SUBSEQUENT HOSPITAL CARE,LEVL III: ICD-10-PCS | Mod: ,,, | Performed by: STUDENT IN AN ORGANIZED HEALTH CARE EDUCATION/TRAINING PROGRAM

## 2023-07-02 PROCEDURE — S4991 NICOTINE PATCH NONLEGEND: HCPCS | Performed by: PSYCHIATRY & NEUROLOGY

## 2023-07-02 PROCEDURE — 11400000 HC PSYCH PRIVATE ROOM

## 2023-07-02 RX ADMIN — CLONAZEPAM 1 MG: 1 TABLET ORAL at 02:07

## 2023-07-02 RX ADMIN — CLONAZEPAM 1 MG: 1 TABLET ORAL at 08:07

## 2023-07-02 RX ADMIN — ARIPIPRAZOLE 20 MG: 10 TABLET ORAL at 08:07

## 2023-07-02 RX ADMIN — NICOTINE 1 PATCH: 14 PATCH, EXTENDED RELEASE TRANSDERMAL at 11:07

## 2023-07-02 RX ADMIN — AMLODIPINE BESYLATE 10 MG: 5 TABLET ORAL at 08:07

## 2023-07-02 NOTE — PROGRESS NOTES
"PSYCHIATRY DAILY INPATIENT PROGRESS NOTE  SUBSEQUENT HOSPITAL VISIT    ENCOUNTER DATE: 7/2/2023  [facility] [place]    DATE OF ADMISSION: 6/26/2023 11:54 PM  LENGTH OF STAY: 6 days      CHIEF COMPLAINT   Richie Zaman is a 38 y.o. male, seen during daily morales rounds on the inpatient unit.  Richie Zaman presented with the chief complaint of depression and psychosis      The patient was seen and examined. The chart was reviewed.     Reviewed notes from Rns and labs from the last 24 hours.    The patient's case was discussed with the treatment team/care providers today including Rns    Staff reports no behavioral or management issues.     The patient has been compliant with treatment.      Subjective 07/02/2023       "I feel happy, I want to get home to send out job applications and clean my yard."    He feels medications are helping.     The patient denies any side effects to medications.        Interim/overnight events per report/notes:  Per RN:  Drinking increased fluids with encouragement.  Complained of anxiety because "I miss my family and my house.  I'm not comfortable here".  Refused prn hydroxyzine saying it gives him "very bad heartburn".  Manual /98, P104.          Psychiatric ROS (observed, reported, or endorsed/denied):  Depressed mood - less  Interest/pleasure/anhedonia: less  Guilt/hopelessness/worthlessness - less  Changes in Sleep - less  Changes in Appetite - less  Changes in Concentration - less  Changes in Energy - less  PMA/R- less  Suicidal- active/passive ideations - less  Homicidal ideations: active/passive ideations - No    Hallucinations - less  Delusions - less  Disorganized behavior - No  Disorganized speech - No  Negative symptoms - fluctuating    Elevated mood - No  Decreased need for sleep - No  Grandiosity - No  Racing thoughts - No  Impulsivity - No  Irritability- No  Increased energy - No  Distractibility - No  Increase in goal-directed activity or PMA- No    Symptoms of OK - " less  Symptoms of Panic Disorder- less  Symptoms of PTSD - No        Overall progress: Patient is showing mild improvement               Medical ROS  Review of Systems   Constitutional:  Negative for chills and fever.   HENT:  Negative for hearing loss and tinnitus.    Eyes:  Negative for blurred vision and double vision.   Respiratory:  Negative for shortness of breath.    Cardiovascular:  Negative for chest pain and palpitations.   Gastrointestinal:  Negative for constipation, diarrhea, nausea and vomiting.   Genitourinary:  Negative for dysuria.   Musculoskeletal:  Negative for back pain and neck pain.   Skin:  Negative for rash.   Neurological:  Negative for dizziness and headaches.   Endo/Heme/Allergies:  Negative for environmental allergies.       PAST MEDICAL HISTORY   Past Medical History:   Diagnosis Date    Bipolar 1 disorder     Hypertension     Methamphetamine abuse            PSYCHOTROPIC MEDICATIONS   Scheduled Meds:   amLODIPine  10 mg Oral Daily    ARIPiprazole  20 mg Oral Daily    clonazePAM  1 mg Oral TID     Continuous Infusions:  PRN Meds:.acetaminophen, aluminum-magnesium hydroxide-simethicone, benzonatate, benztropine mesylate, hydrOXYzine pamoate, loperamide, magnesium hydroxide 400 mg/5 ml, nicotine, OLANZapine **AND** OLANZapine, ondansetron, promethazine        EXAMINATION    VITALS   Vitals:    07/01/23 0738 07/01/23 1933 07/01/23 2045 07/02/23 0741   BP: (!) 140/82 (!) 135/100 (!) 135/98 128/80   BP Location: Left arm Left arm  Left arm   Patient Position: Sitting Sitting  Lying   Pulse: 89 (!) 114 104 96   Resp: 18  20 18   Temp: 97.8 °F (36.6 °C) 97.9 °F (36.6 °C)  98.6 °F (37 °C)   TempSrc: Temporal Temporal  Temporal   SpO2:       Weight:       Height:           Body mass index is 34.16 kg/m².        CONSTITUTIONAL  General Appearance: unremarkable, age appropriate    MUSCULOSKELETAL  Muscle Strength and Tone:no tremor, no tic  Abnormal Involuntary Movements: No  Gait and Station:  non-ataxic    PSYCHIATRIC   Level of Consciousness: awake and alert   Orientation: person, place, and situation  Grooming: Casually dressed and Well groomed  Psychomotor Behavior: normal, cooperative  Speech: normal tone, normal rate, normal pitch, normal volume  Language: grossly intact  Mood: happy  Affect: Consistent with mood  Thought Process: linear, logical  Associations: intact   Thought Content: less SI, less delusions, and denies HI  Perceptions: less AH and denies  VH  Memory: Able to recall past events, Remote intact, and Recent intact  Attention:Attends to interview without distraction  Fund of Knowledge: Aware of current events and Vocabulary appropriate   Estimate if Intelligence:  Average based on work/education history, vocabulary and mental status exam  Insight: has awareness of illness  Judgment: behavior is adequate to circumstances        DIAGNOSTIC TESTING   Laboratory Results  No results found for this or any previous visit (from the past 24 hour(s)).           MEDICAL DECISION MAKING          ASSESSMENT         Bipolar disorder, type II, MRE depressed, severe with psychotic features  OK  Panic disorder  Amphetamine abuse  Nicotine dependence  Psychosocial stressors     SANTI  HTN          PROBLEM LIST AND MANAGEMENT PLANS           Bipolar disorder, type II, MRE depressed, severe with psychotic features  - stop risperdal, wellbutrin  - started/continue abilify 10 mg PO qd  -increase to 15 mg PO qd -increase abilify to 20 mg PO qd today  - pt counseled  - follow up with outpatient mental health providers after discharge for medication management and psychotherapy     OK  - increased home dose of klonopin to 1 mg PO TID    - pt counseled  - follow up with outpatient mental health providers after discharge for medication management and psychotherapy     Panic disorder  - increased home dose of klonopin to 1 mg PO TID  - pt counseled  - follow up with outpatient mental health providers after  discharge for medication management and psychotherapy     Amphetamine abuse  - SW consulted for assistance with additional resources   - pt counseled     Nicotine dependence  - start nicotine patch 14 mg PO qd PRN     Psychosocial stressors  - pt counseled  - SW consulted for assistance with additional resources         SANTI  - FM consulted  - CMP reviewed , CR improved to WNL      HTN  - FM consulted, started norvasc 5 mg PO qd -increase to 10 mg PO qd today                 Discussed diagnosis, risks and benefits of proposed treatment vs alternative treatments vs no treatment, potential side effects of these treatments and the inherent unpredictability of treatment. The patient expresses understanding of the above and displays the capacity to agree with this treatment given said understanding. Patient also agrees that, currently, the benefits outweigh the risks and would like to pursue/continue treatment at this time.    Any medications being used off-label were discussed with the patient inclusive of the evidence base for the use of the medications and consent was obtained for the off-label use of the medication.       DISCHARGE PLANNING  Expected Disposition Plan: Home or Self Care      NEED FOR CONTINUED HOSPITALIZATION  Psychiatric illness continues to pose a potential threat to life or bodily function, of self or others, thereby requiring the need for continued inpatient psychiatric hospitalization: Yes, due to: danger to self, danger to others, and gravely disabled, as evidenced by:  Ongoing concerns with perceptual aberrancy and paranoid persecutory delusions leading to potential harm of self or others.    Protective inpatient pyschiatric hospitalization required while a safe disposition plan is enacted: Yes    Patient stabilized and ready for discharge from inpatient psychiatric unit: No        STAFF:   Mohit Tyson III, MD  Psychiatry

## 2023-07-02 NOTE — PLAN OF CARE
Pt is calm and cooperative.  Still somewhat withdrawn and isolative.  States he is looking forward to going home to see his family tomorrow.  Pt denies any S/I or H/I at this time.  Mood improving.  Appears flat at times.  Compliant with treatment.  Encouraged continued compliance, understanding verbalized.  No acute distress apparent at this time, will continue to monitor.

## 2023-07-02 NOTE — NURSING
Rested quietly with closed eyes and even resp for 7.25 hours.  Modified VC and all precautions maintained.  Pathways clear and bed in low position.

## 2023-07-02 NOTE — PLAN OF CARE
"Drinking increased fluids with encouragement.  Complained of anxiety because "I miss my family and my house.  I'm not comfortable here".  Refused prn hydroxyzine saying it gives him "very bad heartburn".  Manual /98, P104.  Denies suicidal/homicidal thoughts at this time.  Flat affect.  Accepting routine meds and snacks.  Stressed compliance with prescribed meds upon discharge.    "

## 2023-07-03 VITALS
BODY MASS INDEX: 34.15 KG/M2 | DIASTOLIC BLOOD PRESSURE: 83 MMHG | SYSTOLIC BLOOD PRESSURE: 135 MMHG | HEART RATE: 102 BPM | TEMPERATURE: 97 F | WEIGHT: 266.13 LBS | HEIGHT: 74 IN | RESPIRATION RATE: 18 BRPM | OXYGEN SATURATION: 99 %

## 2023-07-03 PROCEDURE — 99239 PR HOSPITAL DISCHARGE DAY,>30 MIN: ICD-10-PCS | Mod: ,,, | Performed by: STUDENT IN AN ORGANIZED HEALTH CARE EDUCATION/TRAINING PROGRAM

## 2023-07-03 PROCEDURE — 25000003 PHARM REV CODE 250: Performed by: STUDENT IN AN ORGANIZED HEALTH CARE EDUCATION/TRAINING PROGRAM

## 2023-07-03 PROCEDURE — 25000003 PHARM REV CODE 250: Performed by: PSYCHIATRY & NEUROLOGY

## 2023-07-03 PROCEDURE — S4991 NICOTINE PATCH NONLEGEND: HCPCS | Performed by: PSYCHIATRY & NEUROLOGY

## 2023-07-03 PROCEDURE — 99239 HOSP IP/OBS DSCHRG MGMT >30: CPT | Mod: ,,, | Performed by: STUDENT IN AN ORGANIZED HEALTH CARE EDUCATION/TRAINING PROGRAM

## 2023-07-03 RX ORDER — AMLODIPINE BESYLATE 10 MG/1
10 TABLET ORAL DAILY
Qty: 30 TABLET | Refills: 0 | Status: SHIPPED | OUTPATIENT
Start: 2023-07-04 | End: 2024-07-03

## 2023-07-03 RX ORDER — CLONAZEPAM 1 MG/1
1 TABLET ORAL 3 TIMES DAILY PRN
Qty: 90 TABLET | Refills: 0 | Status: SHIPPED | OUTPATIENT
Start: 2023-07-03 | End: 2024-07-02

## 2023-07-03 RX ORDER — IBUPROFEN 200 MG
1 TABLET ORAL DAILY PRN
Qty: 28 PATCH | Refills: 0 | Status: SHIPPED | OUTPATIENT
Start: 2023-07-03

## 2023-07-03 RX ORDER — ARIPIPRAZOLE 20 MG/1
20 TABLET ORAL DAILY
Qty: 30 TABLET | Refills: 0 | Status: SHIPPED | OUTPATIENT
Start: 2023-07-04 | End: 2024-07-03

## 2023-07-03 RX ADMIN — AMLODIPINE BESYLATE 10 MG: 5 TABLET ORAL at 08:07

## 2023-07-03 RX ADMIN — NICOTINE 1 PATCH: 14 PATCH, EXTENDED RELEASE TRANSDERMAL at 08:07

## 2023-07-03 RX ADMIN — CLONAZEPAM 1 MG: 1 TABLET ORAL at 02:07

## 2023-07-03 RX ADMIN — CLONAZEPAM 1 MG: 1 TABLET ORAL at 08:07

## 2023-07-03 RX ADMIN — ARIPIPRAZOLE 20 MG: 10 TABLET ORAL at 08:07

## 2023-07-03 NOTE — PLAN OF CARE
"Pt reports feeling "good" today, spending majority of time in room and Interacting appropriately with staff and peers. Denies SI/HI. Denies hallucinations. Appetite adequate. Denies sleep disturbances. NADN. Remains calm and cooperative. Medication compliant. Safety precautions remain in place. Verbalizes readiness for discharge.  "

## 2023-07-03 NOTE — PLAN OF CARE
Problem: Adult Behavioral Health Plan of Care  Goal: Optimized Coping Skills in Response to Life Stressors  Outcome: Ongoing, Progressing  GROUP NOTE:      Pt did not attend group today. PLPC met with pt individually.  PLPC attempted to  discuss with pt on group discussion. Pt was resting in bed quietly. Pt  was covered with blankets over body. Pt states he was not up to attending group today due to he was waiting to be discharged to go home.  PLPC discussed with pt the importance of group. PLPC will come back at a later time and date to discuss group.

## 2023-07-03 NOTE — PLAN OF CARE
"Mood much improved this evening.  Noted smiling.  Healthy ways of coping discussed.  Said he likes to fish, lift weights, spend time with his daughter and his dog, and works on computers.  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  States he knows it's important to "Look at things from different perspectives".  Said he needs to look for job.  Goal for tomorrow is to "Go hug my wife".  Encouraged increased fluids and regular meals.  VS stable.  Stressed compliance with prescribed medications upon discharge.    "

## 2023-07-03 NOTE — PSYCH
Pt wife Joan contacted [facility] to discuss about pt discharge and if we will keep him until his ride gets to the hospital. [facility] until pt ride arrives.

## 2023-07-03 NOTE — CARE UPDATE
[facility]  Encounter Date: 2023 11:54 PM    Discharge Date No discharge date for patient encounter.   Hospital Account: [id number]    MRN: [id number]   Guarantor: JOHNNILSON SALCIDOIC   Contact Serial #: [id number]         ENCOUNTER             Patient Class: IP Psych   Unit: [facility]*   Hospital Service: Psychiatry   Bed: 208 W   Admitting Provider: David Bowen Md   Referring Physician: Valente Schwarz   Attending Provider: Mohit Tyson Iii, Md   Adm Diagnosis: Depression with suicidal*      PATIENT                 Name: GRAHAM ZAMAN : 1985 (38 yrs)   Address: [address] [place] [address] Sex: Male   City: [place] [address]       Primary Care Provider: Babar Low MD         Primary Phone: [phone]   EMERGENCY CONTACT   Contact Name Legal Guardian? Relationship to Patient Home Phone Work Phone Mobile Phone   1. Joan Zaman  2. william ariza No    Spouse  Mother [phone]      GUARANTOR                  Guarantor: GRAHAM ZAMAN       : 1985   Address: [address]     Sex: [place] [address] Guarantor  Type: B/H   Relation to Patient: Self         Home Phone: [phone]   Guarantor ID: [id number]         Work Phone:     GUARANTOR EMPLOYER     Employer: [employer]           Status: FULL TIME      COVERAGE          PRIMARY INSURANCE   Payor / Plan: [place] [employer]       Group Number:         Subscriber Name: JOHNNILSON SALCIDOIC Subscriber : 1985   Subscriber ID: [id number] Pat. Rel. to Subscriber: Self   Insurance Address: [address] [place] [id number]       SECONDARY INSURANCE   Payor / Plan: - No Secondary Coverage -       Group Number:         Subscriber Name:   Subscriber :     Subscriber ID:   Pat. Rel. to Subscriber:     Insurance Address:

## 2023-07-03 NOTE — PSYCH
PLPC contacted mother Moira Benítez to discuss ride for pt upon discharge. Mother states pt father will come to  pt at 3pm today.
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
## 2023-07-06 ENCOUNTER — PATIENT OUTREACH (OUTPATIENT)
Dept: ADMINISTRATIVE | Facility: CLINIC | Age: 38
End: 2023-07-06
Payer: MEDICAID
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
## 2023-07-26 ENCOUNTER — TELEPHONE (OUTPATIENT)
Dept: PSYCHIATRY | Facility: CLINIC | Age: 38
End: 2023-07-26
Payer: MEDICAID

## 2023-07-26 NOTE — TELEPHONE ENCOUNTER
----- Message from Lolis Theron sent at 2023 11:20 AM CDT -----  Contact: Moira Zaman  MRN: [id number]  : 1985  PCP: Babar Low  Home Phone      [phone]  Work Phone      Not on file.  Mobile          [phone]      MESSAGE: Moira the pts mom states she called [facility] and they have not received the after care paper work from his inpatient stay. With out the paper work, they can't schedule the pt. Moira states she knows her son will run out of medication soon and he is doing well. She praised our staff for the care the pt received. Please advise       Moira [phone]

## 2023-07-26 NOTE — TELEPHONE ENCOUNTER
Explained to patient that I do not have access to inpatient U records and gave her the number to medical records.

## 2023-10-02 PROBLEM — N17.9 AKI (ACUTE KIDNEY INJURY): Status: RESOLVED | Noted: 2023-06-27 | Resolved: 2023-10-02
